# Patient Record
Sex: FEMALE | Race: WHITE | NOT HISPANIC OR LATINO | URBAN - METROPOLITAN AREA
[De-identification: names, ages, dates, MRNs, and addresses within clinical notes are randomized per-mention and may not be internally consistent; named-entity substitution may affect disease eponyms.]

---

## 2023-10-17 ENCOUNTER — EMERGENCY (EMERGENCY)
Facility: HOSPITAL | Age: 23
LOS: 1 days | Discharge: HOME | End: 2023-10-17
Attending: EMERGENCY MEDICINE | Admitting: EMERGENCY MEDICINE
Payer: COMMERCIAL

## 2023-10-17 VITALS
RESPIRATION RATE: 18 BRPM | TEMPERATURE: 99 F | OXYGEN SATURATION: 100 % | HEART RATE: 94 BPM | SYSTOLIC BLOOD PRESSURE: 131 MMHG | DIASTOLIC BLOOD PRESSURE: 80 MMHG

## 2023-10-17 VITALS
SYSTOLIC BLOOD PRESSURE: 132 MMHG | DIASTOLIC BLOOD PRESSURE: 85 MMHG | RESPIRATION RATE: 18 BRPM | OXYGEN SATURATION: 99 % | TEMPERATURE: 100 F | HEART RATE: 105 BPM | WEIGHT: 160.06 LBS

## 2023-10-17 LAB
ALBUMIN SERPL ELPH-MCNC: 4.8 G/DL — SIGNIFICANT CHANGE UP (ref 3.3–5)
ALBUMIN SERPL ELPH-MCNC: 4.8 G/DL — SIGNIFICANT CHANGE UP (ref 3.3–5)
ALP SERPL-CCNC: 60 U/L — SIGNIFICANT CHANGE UP (ref 40–120)
ALP SERPL-CCNC: 60 U/L — SIGNIFICANT CHANGE UP (ref 40–120)
ALT FLD-CCNC: 13 U/L — SIGNIFICANT CHANGE UP (ref 10–45)
ALT FLD-CCNC: 13 U/L — SIGNIFICANT CHANGE UP (ref 10–45)
ANION GAP SERPL CALC-SCNC: 17 MMOL/L — SIGNIFICANT CHANGE UP (ref 5–17)
ANION GAP SERPL CALC-SCNC: 17 MMOL/L — SIGNIFICANT CHANGE UP (ref 5–17)
APPEARANCE UR: CLEAR — SIGNIFICANT CHANGE UP
APPEARANCE UR: CLEAR — SIGNIFICANT CHANGE UP
AST SERPL-CCNC: 24 U/L — SIGNIFICANT CHANGE UP (ref 10–40)
AST SERPL-CCNC: 24 U/L — SIGNIFICANT CHANGE UP (ref 10–40)
BACTERIA # UR AUTO: PRESENT /HPF
BACTERIA # UR AUTO: PRESENT /HPF
BASOPHILS # BLD AUTO: 0.05 K/UL — SIGNIFICANT CHANGE UP (ref 0–0.2)
BASOPHILS # BLD AUTO: 0.05 K/UL — SIGNIFICANT CHANGE UP (ref 0–0.2)
BASOPHILS NFR BLD AUTO: 0.4 % — SIGNIFICANT CHANGE UP (ref 0–2)
BASOPHILS NFR BLD AUTO: 0.4 % — SIGNIFICANT CHANGE UP (ref 0–2)
BILIRUB SERPL-MCNC: 0.4 MG/DL — SIGNIFICANT CHANGE UP (ref 0.2–1.2)
BILIRUB SERPL-MCNC: 0.4 MG/DL — SIGNIFICANT CHANGE UP (ref 0.2–1.2)
BILIRUB UR-MCNC: NEGATIVE — SIGNIFICANT CHANGE UP
BILIRUB UR-MCNC: NEGATIVE — SIGNIFICANT CHANGE UP
BLD GP AB SCN SERPL QL: NEGATIVE — SIGNIFICANT CHANGE UP
BLD GP AB SCN SERPL QL: NEGATIVE — SIGNIFICANT CHANGE UP
BUN SERPL-MCNC: 15 MG/DL — SIGNIFICANT CHANGE UP (ref 7–23)
BUN SERPL-MCNC: 15 MG/DL — SIGNIFICANT CHANGE UP (ref 7–23)
CALCIUM SERPL-MCNC: 9.6 MG/DL — SIGNIFICANT CHANGE UP (ref 8.4–10.5)
CALCIUM SERPL-MCNC: 9.6 MG/DL — SIGNIFICANT CHANGE UP (ref 8.4–10.5)
CHLORIDE SERPL-SCNC: 103 MMOL/L — SIGNIFICANT CHANGE UP (ref 96–108)
CHLORIDE SERPL-SCNC: 103 MMOL/L — SIGNIFICANT CHANGE UP (ref 96–108)
CO2 SERPL-SCNC: 16 MMOL/L — LOW (ref 22–31)
CO2 SERPL-SCNC: 16 MMOL/L — LOW (ref 22–31)
COLOR SPEC: YELLOW — SIGNIFICANT CHANGE UP
COLOR SPEC: YELLOW — SIGNIFICANT CHANGE UP
CREAT SERPL-MCNC: 0.84 MG/DL — SIGNIFICANT CHANGE UP (ref 0.5–1.3)
CREAT SERPL-MCNC: 0.84 MG/DL — SIGNIFICANT CHANGE UP (ref 0.5–1.3)
DIFF PNL FLD: ABNORMAL
DIFF PNL FLD: ABNORMAL
EGFR: 99 ML/MIN/1.73M2 — SIGNIFICANT CHANGE UP
EGFR: 99 ML/MIN/1.73M2 — SIGNIFICANT CHANGE UP
EOSINOPHIL # BLD AUTO: 0.01 K/UL — SIGNIFICANT CHANGE UP (ref 0–0.5)
EOSINOPHIL # BLD AUTO: 0.01 K/UL — SIGNIFICANT CHANGE UP (ref 0–0.5)
EOSINOPHIL NFR BLD AUTO: 0.1 % — SIGNIFICANT CHANGE UP (ref 0–6)
EOSINOPHIL NFR BLD AUTO: 0.1 % — SIGNIFICANT CHANGE UP (ref 0–6)
EPI CELLS # UR: SIGNIFICANT CHANGE UP /HPF (ref 0–5)
EPI CELLS # UR: SIGNIFICANT CHANGE UP /HPF (ref 0–5)
GLUCOSE SERPL-MCNC: 105 MG/DL — HIGH (ref 70–99)
GLUCOSE SERPL-MCNC: 105 MG/DL — HIGH (ref 70–99)
GLUCOSE UR QL: NEGATIVE — SIGNIFICANT CHANGE UP
GLUCOSE UR QL: NEGATIVE — SIGNIFICANT CHANGE UP
HCG SERPL-ACNC: 764 MIU/ML — HIGH
HCG SERPL-ACNC: 764 MIU/ML — HIGH
HCT VFR BLD CALC: 42.2 % — SIGNIFICANT CHANGE UP (ref 34.5–45)
HCT VFR BLD CALC: 42.2 % — SIGNIFICANT CHANGE UP (ref 34.5–45)
HGB BLD-MCNC: 14.3 G/DL — SIGNIFICANT CHANGE UP (ref 11.5–15.5)
HGB BLD-MCNC: 14.3 G/DL — SIGNIFICANT CHANGE UP (ref 11.5–15.5)
IMM GRANULOCYTES NFR BLD AUTO: 0.4 % — SIGNIFICANT CHANGE UP (ref 0–0.9)
IMM GRANULOCYTES NFR BLD AUTO: 0.4 % — SIGNIFICANT CHANGE UP (ref 0–0.9)
KETONES UR-MCNC: NEGATIVE — SIGNIFICANT CHANGE UP
KETONES UR-MCNC: NEGATIVE — SIGNIFICANT CHANGE UP
LEUKOCYTE ESTERASE UR-ACNC: ABNORMAL
LEUKOCYTE ESTERASE UR-ACNC: ABNORMAL
LIDOCAIN IGE QN: 26 U/L — SIGNIFICANT CHANGE UP (ref 7–60)
LIDOCAIN IGE QN: 26 U/L — SIGNIFICANT CHANGE UP (ref 7–60)
LYMPHOCYTES # BLD AUTO: 15.5 % — SIGNIFICANT CHANGE UP (ref 13–44)
LYMPHOCYTES # BLD AUTO: 15.5 % — SIGNIFICANT CHANGE UP (ref 13–44)
LYMPHOCYTES # BLD AUTO: 2.05 K/UL — SIGNIFICANT CHANGE UP (ref 1–3.3)
LYMPHOCYTES # BLD AUTO: 2.05 K/UL — SIGNIFICANT CHANGE UP (ref 1–3.3)
MCHC RBC-ENTMCNC: 29.8 PG — SIGNIFICANT CHANGE UP (ref 27–34)
MCHC RBC-ENTMCNC: 29.8 PG — SIGNIFICANT CHANGE UP (ref 27–34)
MCHC RBC-ENTMCNC: 33.9 GM/DL — SIGNIFICANT CHANGE UP (ref 32–36)
MCHC RBC-ENTMCNC: 33.9 GM/DL — SIGNIFICANT CHANGE UP (ref 32–36)
MCV RBC AUTO: 87.9 FL — SIGNIFICANT CHANGE UP (ref 80–100)
MCV RBC AUTO: 87.9 FL — SIGNIFICANT CHANGE UP (ref 80–100)
MONOCYTES # BLD AUTO: 0.59 K/UL — SIGNIFICANT CHANGE UP (ref 0–0.9)
MONOCYTES # BLD AUTO: 0.59 K/UL — SIGNIFICANT CHANGE UP (ref 0–0.9)
MONOCYTES NFR BLD AUTO: 4.5 % — SIGNIFICANT CHANGE UP (ref 2–14)
MONOCYTES NFR BLD AUTO: 4.5 % — SIGNIFICANT CHANGE UP (ref 2–14)
NEUTROPHILS # BLD AUTO: 10.5 K/UL — HIGH (ref 1.8–7.4)
NEUTROPHILS # BLD AUTO: 10.5 K/UL — HIGH (ref 1.8–7.4)
NEUTROPHILS NFR BLD AUTO: 79.1 % — HIGH (ref 43–77)
NEUTROPHILS NFR BLD AUTO: 79.1 % — HIGH (ref 43–77)
NITRITE UR-MCNC: NEGATIVE — SIGNIFICANT CHANGE UP
NITRITE UR-MCNC: NEGATIVE — SIGNIFICANT CHANGE UP
NRBC # BLD: 0 /100 WBCS — SIGNIFICANT CHANGE UP (ref 0–0)
NRBC # BLD: 0 /100 WBCS — SIGNIFICANT CHANGE UP (ref 0–0)
PH UR: 6 — SIGNIFICANT CHANGE UP (ref 5–8)
PH UR: 6 — SIGNIFICANT CHANGE UP (ref 5–8)
PLATELET # BLD AUTO: 308 K/UL — SIGNIFICANT CHANGE UP (ref 150–400)
PLATELET # BLD AUTO: 308 K/UL — SIGNIFICANT CHANGE UP (ref 150–400)
POTASSIUM SERPL-MCNC: 4.2 MMOL/L — SIGNIFICANT CHANGE UP (ref 3.5–5.3)
POTASSIUM SERPL-MCNC: 4.2 MMOL/L — SIGNIFICANT CHANGE UP (ref 3.5–5.3)
POTASSIUM SERPL-SCNC: 4.2 MMOL/L — SIGNIFICANT CHANGE UP (ref 3.5–5.3)
POTASSIUM SERPL-SCNC: 4.2 MMOL/L — SIGNIFICANT CHANGE UP (ref 3.5–5.3)
PROT SERPL-MCNC: 7.7 G/DL — SIGNIFICANT CHANGE UP (ref 6–8.3)
PROT SERPL-MCNC: 7.7 G/DL — SIGNIFICANT CHANGE UP (ref 6–8.3)
PROT UR-MCNC: NEGATIVE MG/DL — SIGNIFICANT CHANGE UP
PROT UR-MCNC: NEGATIVE MG/DL — SIGNIFICANT CHANGE UP
RBC # BLD: 4.8 M/UL — SIGNIFICANT CHANGE UP (ref 3.8–5.2)
RBC # BLD: 4.8 M/UL — SIGNIFICANT CHANGE UP (ref 3.8–5.2)
RBC # FLD: 12.4 % — SIGNIFICANT CHANGE UP (ref 10.3–14.5)
RBC # FLD: 12.4 % — SIGNIFICANT CHANGE UP (ref 10.3–14.5)
RBC CASTS # UR COMP ASSIST: < 5 /HPF — SIGNIFICANT CHANGE UP
RBC CASTS # UR COMP ASSIST: < 5 /HPF — SIGNIFICANT CHANGE UP
RH IG SCN BLD-IMP: POSITIVE — SIGNIFICANT CHANGE UP
SODIUM SERPL-SCNC: 136 MMOL/L — SIGNIFICANT CHANGE UP (ref 135–145)
SODIUM SERPL-SCNC: 136 MMOL/L — SIGNIFICANT CHANGE UP (ref 135–145)
SP GR SPEC: <=1.005 — SIGNIFICANT CHANGE UP (ref 1–1.03)
SP GR SPEC: <=1.005 — SIGNIFICANT CHANGE UP (ref 1–1.03)
UROBILINOGEN FLD QL: 0.2 E.U./DL — SIGNIFICANT CHANGE UP
UROBILINOGEN FLD QL: 0.2 E.U./DL — SIGNIFICANT CHANGE UP
WBC # BLD: 13.25 K/UL — HIGH (ref 3.8–10.5)
WBC # BLD: 13.25 K/UL — HIGH (ref 3.8–10.5)
WBC # FLD AUTO: 13.25 K/UL — HIGH (ref 3.8–10.5)
WBC # FLD AUTO: 13.25 K/UL — HIGH (ref 3.8–10.5)
WBC UR QL: ABNORMAL /HPF
WBC UR QL: ABNORMAL /HPF

## 2023-10-17 PROCEDURE — 84702 CHORIONIC GONADOTROPIN TEST: CPT

## 2023-10-17 PROCEDURE — 80053 COMPREHEN METABOLIC PANEL: CPT

## 2023-10-17 PROCEDURE — 36415 COLL VENOUS BLD VENIPUNCTURE: CPT

## 2023-10-17 PROCEDURE — 99284 EMERGENCY DEPT VISIT MOD MDM: CPT | Mod: 25

## 2023-10-17 PROCEDURE — 86850 RBC ANTIBODY SCREEN: CPT

## 2023-10-17 PROCEDURE — 83690 ASSAY OF LIPASE: CPT

## 2023-10-17 PROCEDURE — 76801 OB US < 14 WKS SINGLE FETUS: CPT

## 2023-10-17 PROCEDURE — 99284 EMERGENCY DEPT VISIT MOD MDM: CPT

## 2023-10-17 PROCEDURE — 86900 BLOOD TYPING SEROLOGIC ABO: CPT

## 2023-10-17 PROCEDURE — 76801 OB US < 14 WKS SINGLE FETUS: CPT | Mod: 26

## 2023-10-17 PROCEDURE — 86901 BLOOD TYPING SEROLOGIC RH(D): CPT

## 2023-10-17 PROCEDURE — 81001 URINALYSIS AUTO W/SCOPE: CPT

## 2023-10-17 PROCEDURE — 76817 TRANSVAGINAL US OBSTETRIC: CPT | Mod: 26

## 2023-10-17 PROCEDURE — 76817 TRANSVAGINAL US OBSTETRIC: CPT

## 2023-10-17 PROCEDURE — 85025 COMPLETE CBC W/AUTO DIFF WBC: CPT

## 2023-10-17 RX ORDER — SODIUM CHLORIDE 9 MG/ML
1000 INJECTION INTRAMUSCULAR; INTRAVENOUS; SUBCUTANEOUS ONCE
Refills: 0 | Status: COMPLETED | OUTPATIENT
Start: 2023-10-17 | End: 2023-10-17

## 2023-10-17 RX ORDER — ACETAMINOPHEN 500 MG
1000 TABLET ORAL ONCE
Refills: 0 | Status: COMPLETED | OUTPATIENT
Start: 2023-10-17 | End: 2023-10-17

## 2023-10-17 RX ADMIN — SODIUM CHLORIDE 1000 MILLILITER(S): 9 INJECTION INTRAMUSCULAR; INTRAVENOUS; SUBCUTANEOUS at 18:42

## 2023-10-17 RX ADMIN — Medication 1000 MILLIGRAM(S): at 21:38

## 2023-10-17 NOTE — ED PROVIDER NOTE - PATIENT PORTAL LINK FT
You can access the FollowMyHealth Patient Portal offered by [facility] by registering at the following website: [email or web address]. By joining [employer]’s FollowMyHealth portal, you will also be able to view your health information using other applications (apps) compatible with our system.

## 2023-10-17 NOTE — CONSULT NOTE ADULT - ASSESSMENT
23y  at 5w3d w/ (questionable) LMP around 910 presenting for r/o ectopic pregnancy, found to have pregnancy of unknown location w/ bHCG of 764 today. Pregnancy is not desired.  - patient without pain or active vaginal bleeding, hemodynamically stable, with reassuring hgb and reassuring physical exam. Ruptured ectopic pregnancy unlikely.  - IUD removed today given contraceptive failure.  - patient counseled that given pregnancy of unknown location with bHCG of 764, currently it is too early to tell if this is a normal IUP vs abnormal IUP vs ectopic. She will return to [facility] ED for repeat bhcg. She was counseled to return as soon as possible if she experiences heavy vaginal bleeding, lightheadedness/dizziness, SOB, CP, as that may be symptoms of severe anemia.  - Patient safe for dc from GYN standpoint.  - d/w Dr. Wilkins 23y  at 5w3d w/ (questionable) LMP around 910 presenting for r/o ectopic pregnancy, found to have pregnancy of unknown location w/ bHCG of 764 today. Pregnancy is not desired.  - patient without pain or active vaginal bleeding, hemodynamically stable, with reassuring hgb and reassuring physical exam. Ruptured ectopic pregnancy unlikely.  - IUD removed today given contraceptive failure.  - patient counseled that given pregnancy of unknown location with bHCG of 764, currently it is too early to tell if this is a normal IUP vs abnormal IUP vs ectopic. She will return to [facility] ED for repeat bhcg. She was counseled to return as soon as possible if she experiences heavy vaginal bleeding, lightheadedness/dizziness, SOB, CP, as that may be symptoms of severe anemia.  - RH positive, no need for rhogam  - Patient safe for dc from GYN standpoint.  - d/w Dr. Wilkins

## 2023-10-17 NOTE — ED ADULT TRIAGE NOTE - CHIEF COMPLAINT QUOTE
+L pelvic pain x 2 days, severe, has IUD, +preg test at obgyn, +bleeding (1 tampon for 8 hours), r/o ectopic  LMP 10/9

## 2023-10-17 NOTE — CONSULT NOTE ADULT - SUBJECTIVE AND OBJECTIVE BOX
23y  at 5w3d w/ (questionable) LMP around 9/10 presenting with vaginal spotting, and mild abd cramping that has resolved. GYN consulted for r/o ectopic pregnancy. She reports feeling some mild lower abd cramping Monday morning which resolved w/ tylenol. She also developed some spotting, which is still ongoing. Denies lightheadedness/dizziness, SOB, CP, heavy vaginal bleeding. Denies N/V, F/C, issues with bowel movements or voiding. She went to her GYN today for these complaints and was told to go to [facility] ED as her UPT was positive today.     OB H/x: G1 current    GYN H/x: Has regular periods w/ IUD in place. (Mirena IUD in place for past 3 years)    MED H/x: denies    SURG H/x: knee surgery as a child, no hardware    Medications: none  Allergies: metronidazole (rash, facial swelling)  social: denies       Vital Signs Last 24 Hrs  T(C): 37.3 (17 Oct 2023 22:35), Max: 37.6 (17 Oct 2023 18:05)  T(F): 99.1 (17 Oct 2023 22:35), Max: 99.7 (17 Oct 2023 18:05)  HR: 94 (17 Oct 2023 22:35) (94 - 105)  BP: 131/80 (17 Oct 2023 22:35) (131/80 - 132/85)  BP(mean): --  RR: 18 (17 Oct 2023 22:35) (18 - 18)  SpO2: 100% (17 Oct 2023 22:35) (99% - 100%)    Parameters below as of 17 Oct 2023 22:35  Patient On (Oxygen Delivery Method): room air        Physical Exam:  Gen: NAD, comfortable  GI: soft, nontender, nondistended + BS, no rebound no guarding  BME: normal anteverted uterus, no adnexal masses appreciated , No cervical motion tenderness   Spec: normal cervix w/ IUD strings visible, normal vaginal vault, 5cc of old clots noted, no active bleeding with valsalva. IUD removed.  Ext: no edema, erythema, tenderness     LABS:                        14.3   13.25 )-----------( 308      ( 17 Oct 2023 18:57 )             42.2     10-    136  |  103  |  15  ----------------------------<  105<H>  4.2   |  16<L>  |  0.84    Ca    9.6      17 Oct 2023 18:57    TPro  7.7  /  Alb  4.8  /  TBili  0.4  /  DBili  x   /  AST  24  /  ALT  13  /  AlkPhos  60  10-17      Urinalysis Basic - ( 17 Oct 2023 18:58 )    Color: Yellow / Appearance: Clear / SG: <=1.005 / pH: x  Gluc: x / Ketone: NEGATIVE  / Bili: Negative / Urobili: 0.2 E.U./dL   Blood: x / Protein: NEGATIVE mg/dL / Nitrite: NEGATIVE   Leuk Esterase: Trace / RBC: < 5 /HPF / WBC 5-10 /HPF   Sq Epi: x / Non Sq Epi: x / Bacteria: Present /HPF    bHCT 10/17 764    RADIOLOGY & ADDITIONAL STUDIES:      ACC: [id number] EXAM:  US OB LES THAN 14 WKS 1ST GEST   ORDERED BY: DIANDRA YEAGER     ACC: [id number] EXAM:  US OB TRANSVAGINAL   ORDERED BY: DIANDRA YEAGER     PROCEDURE DATE:  10/17/2023          INTERPRETATION:  CLINICAL INFORMATION: RO torsion/ectopic pregnancy    LMP: 10/9/2023    COMPARISON:None available.    TECHNIQUE: Transabdominal and transvaginal pelvic sonogram.    FINDINGS:    Uterus:  No intrauterine or extrauterine gestation identified. Uterus measures 8.4   x 2.8 x 5.2 cm. Endometrium measures 6 mm in thickness. IUD in situ.    Right ovary: 2.8 x 1.3 x 2.0  cm. Within normal limits. Normal vascular   flow.  Left ovary: 3.0 x 2.0 x 2.1 cm. probable corpus luteum noted. Normal   vascular flow.  Free fluid:None.    IMPRESSION: No intrauterine or extrauterine gestation identified.   Recommend correlation with serial beta hCG and short-term follow-up study.    No evidence of ovarian torsion.    --- End of Report ---            KEVIN REDMAN MD; Attending Radiologist  This document has been electronically signed. Oct 17 2023  9:32PM

## 2023-10-17 NOTE — ED PROVIDER NOTE - CLINICAL SUMMARY MEDICAL DECISION MAKING FREE TEXT BOX
23 F w L sided pelvic pain IUD and + preg tst- us labs RO ectopic 23 F w L sided pelvic pain IUD and + preg tst- us labs RO ectopic  seen by GYN no obvious mass/ectopic- GYN- removed IUD and rec FU in 48 hrs- repeat beta

## 2023-10-17 NOTE — ED ADULT NURSE NOTE - OBJECTIVE STATEMENT
Pt  to ED c/o abd pain, L sided pelvic pain, spotting w/IUD in place, was at gyn had + preg test today, LMP a few days ago. Bled through 1 tampon today, no significant vaginal bleeding. Denies CP, SOB, dizziness, A&ox4. Pt denies n/v/d, fever, cough chills. ambulates w steady gait. speaking in full sentences.

## 2023-10-17 NOTE — ED PROVIDER NOTE - NSFOLLOWUPINSTRUCTIONS_ED_ALL_ED_FT
RETURN IN 48 HRS FOR REPEAT BLOOD WORK    RETURN IMMEDIATELY FOR INCREASING PAIN BLOATING LIGHTHEADEDNESS VAGINAL BLEEDING WEAKNESS

## 2023-10-17 NOTE — ED PROVIDER NOTE - OBJECTIVE STATEMENT
23 F co L sided pelvic pain- 1 day of sharp L sided pain- her LMP was a few days ago- ended 2-3 days ago- then started bleeding again- 1 tampon today- at gyn had + preg test today and has an IUD - sent in for ro ectopic  no f/c no n/v  + no distention

## 2023-10-19 ENCOUNTER — EMERGENCY (EMERGENCY)
Facility: HOSPITAL | Age: 23
LOS: 1 days | Discharge: HOME | End: 2023-10-19
Admitting: EMERGENCY MEDICINE
Payer: COMMERCIAL

## 2023-10-19 VITALS
DIASTOLIC BLOOD PRESSURE: 82 MMHG | RESPIRATION RATE: 18 BRPM | OXYGEN SATURATION: 98 % | SYSTOLIC BLOOD PRESSURE: 131 MMHG | HEART RATE: 100 BPM | WEIGHT: 160.06 LBS | TEMPERATURE: 98 F

## 2023-10-19 VITALS
HEART RATE: 70 BPM | RESPIRATION RATE: 18 BRPM | TEMPERATURE: 99 F | SYSTOLIC BLOOD PRESSURE: 114 MMHG | DIASTOLIC BLOOD PRESSURE: 75 MMHG | OXYGEN SATURATION: 100 %

## 2023-10-19 DIAGNOSIS — Z88.1 ALLERGY STATUS TO OTHER ANTIBIOTIC AGENTS STATUS: ICD-10-CM

## 2023-10-19 DIAGNOSIS — Z3A.01 LESS THAN 8 WEEKS GESTATION OF PREGNANCY: ICD-10-CM

## 2023-10-19 DIAGNOSIS — O26.851 SPOTTING COMPLICATING PREGNANCY, FIRST TRIMESTER: ICD-10-CM

## 2023-10-19 DIAGNOSIS — Z87.39 PERSONAL HISTORY OF OTHER DISEASES OF THE MUSCULOSKELETAL SYSTEM AND CONNECTIVE TISSUE: ICD-10-CM

## 2023-10-19 DIAGNOSIS — O20.9 HEMORRHAGE IN EARLY PREGNANCY, UNSPECIFIED: ICD-10-CM

## 2023-10-19 DIAGNOSIS — R10.2 PELVIC AND PERINEAL PAIN: ICD-10-CM

## 2023-10-19 DIAGNOSIS — N83.202 UNSPECIFIED OVARIAN CYST, LEFT SIDE: ICD-10-CM

## 2023-10-19 DIAGNOSIS — O99.891 OTHER SPECIFIED DISEASES AND CONDITIONS COMPLICATING PREGNANCY: ICD-10-CM

## 2023-10-19 DIAGNOSIS — Z88.8 ALLERGY STATUS TO OTHER DRUGS, MEDICAMENTS AND BIOLOGICAL SUBSTANCES: ICD-10-CM

## 2023-10-19 DIAGNOSIS — O36.80X0 PREGNANCY WITH INCONCLUSIVE FETAL VIABILITY, NOT APPLICABLE OR UNSPECIFIED: ICD-10-CM

## 2023-10-19 DIAGNOSIS — Z97.5 PRESENCE OF (INTRAUTERINE) CONTRACEPTIVE DEVICE: ICD-10-CM

## 2023-10-19 DIAGNOSIS — Z01.419 ENCOUNTER FOR GYNECOLOGICAL EXAMINATION (GENERAL) (ROUTINE) WITHOUT ABNORMAL FINDINGS: ICD-10-CM

## 2023-10-19 DIAGNOSIS — O34.81 MATERNAL CARE FOR OTHER ABNORMALITIES OF PELVIC ORGANS, FIRST TRIMESTER: ICD-10-CM

## 2023-10-19 LAB
HCG SERPL-ACNC: 404 MIU/ML — HIGH
HCG SERPL-ACNC: 404 MIU/ML — HIGH

## 2023-10-19 PROCEDURE — 76817 TRANSVAGINAL US OBSTETRIC: CPT

## 2023-10-19 PROCEDURE — 36415 COLL VENOUS BLD VENIPUNCTURE: CPT

## 2023-10-19 PROCEDURE — 84702 CHORIONIC GONADOTROPIN TEST: CPT

## 2023-10-19 PROCEDURE — 76801 OB US < 14 WKS SINGLE FETUS: CPT

## 2023-10-19 PROCEDURE — 99285 EMERGENCY DEPT VISIT HI MDM: CPT

## 2023-10-19 PROCEDURE — 76801 OB US < 14 WKS SINGLE FETUS: CPT | Mod: 26

## 2023-10-19 PROCEDURE — 76817 TRANSVAGINAL US OBSTETRIC: CPT | Mod: 26

## 2023-10-19 PROCEDURE — 99284 EMERGENCY DEPT VISIT MOD MDM: CPT | Mod: 25

## 2023-10-19 NOTE — ED PROVIDER NOTE - CLINICAL SUMMARY MEDICAL DECISION MAKING FREE TEXT BOX
pt returns to ED for beta hcg check -- was seen on 10/17 and had IUD removed (beta was 764), pt feels well, no c/o, no abd pain, using 1 tampon/day, benign abd, hemodynamically stable, beta trending down - gyn cleared for dc, strict return precautions given, pt understands and agrees w/plan pt returns to ED for beta hcg check -- was seen on 10/17 and had IUD removed (beta was 764), pt feels well, no c/o, no abd pain, using 1 tampon/day, benign abd, hemodynamically stable, beta trending down - us done as per gyn -  ? ectopic vs fibroid - f/u in 2 d. gyn cleared for dc, strict return precautions given, pt understands and agrees w/plan

## 2023-10-19 NOTE — ED PROVIDER NOTE - NSFOLLOWUPINSTRUCTIONS_ED_ALL_ED_FT
please return in 2 days for repeat blood work and evaluation  return immediately for any worsening or concerning symptoms

## 2023-10-19 NOTE — ED PROVIDER NOTE - GENITOURINARY [+], MLM
beta hcg check [place] Pregnancy And Lactation Text: This medication is Pregnancy Category X and contraindicated in pregnancy and in women who may become pregnant. It is unknown if this medication is excreted in breast milk.

## 2023-10-19 NOTE — CONSULT NOTE ADULT - ASSESSMENT
22yo  at 5w2d presents with POUL, bhcg is downtrending from 764 to 404.   -Pt asymptomatic today   -A+ blood type, no need rhogam   -VSS   -Please obtain US   -Dr. Wilkins in agreement w/ plan  24yo  at 5w4d presents with POUL, bhcg is downtrending from 764 to 404.   -Pt has no abdominal pain or cramping today, minimal vaginal bleeding.   -A+ blood type, no need rhogam   -VSS   -Please obtain US    -Dr. Wilkins in agreement w/ plan  24yo  at 5w4d presents with POUL vs L adnexal ectopic, bhcg is downtrending from 764 to 404.   -VSS Pt has no abdominal pain or cramping today, minimal vaginal bleeding.   -A+ blood type, no need rhogam   -US as above shows new 1.5cm cystic structure in the L adnexa suggestive of ectopic pregnancy vs fibroid. Imperative to have pt have short term follow up   -Pt to come back in 2d for repeat bhcg, +/- US.   -Per Dr Wilkins no need to give MTX since bhcg is trending down and pt does not have any abdominal pain/cramping.   -Pt given strict return precautions; pt advised that ectopic pregnancy can be life threatening and it is important to keep close follow up.  Pt advised to watch for severe abd pain, heavy vaginal bleeding, dizziness, lightheadedness.  Pt understands and states she will follow up in 2d.   -Dr. Wilkins in agreement w/ plan

## 2023-10-19 NOTE — ED PROVIDER NOTE - PATIENT PORTAL LINK FT
You can access the FollowMyHealth Patient Portal offered by [facility] by registering at the following website: [email or web address]. By joining [employer]’s FollowMyHealth portal, you will also be able to view your health information using other applications (apps) compatible with our system.

## 2023-10-19 NOTE — CONSULT NOTE ADULT - SUBJECTIVE AND OBJECTIVE BOX
23y  at 5w6d w/ (questionable) LMP around 9/10 presenting for repeat bhcg with Pregnancy of unknown location. Pt came in 2d ago with vaginal bleeding and cramping that resolved upon arrival, at that time pt had a bhcg of 764 and POUL on US, pt had IUD remoed at that time.  Pt came back today with no complaints. Denies lightheadedness/dizziness, SOB, CP, heavy vaginal bleeding. Denies N/V, F/C, issues with bowel movements or voiding.     OB H/x: G1 current  GYN H/x: Has regular periods w/ IUD in place. (Mirena IUD in place for past 3 years) removed on 10/17   MED H/x: denies  SURG H/x: knee surgery as a child, no hardware  Medications: none  Allergies: metronidazole (rash, facial swelling)  social: denies       PHYSICAL EXAM:   Vital Signs Last 24 Hrs  T(C): 36.8 (19 Oct 2023 09:24), Max: 36.8 (19 Oct 2023 09:24)  T(F): 98.3 (19 Oct 2023 09:24), Max: 98.3 (19 Oct 2023 09:24)  HR: 100 (19 Oct 2023 09:24) (100 - 100)  BP: 131/82 (19 Oct 2023 09:24) (131/82 - 131/82)  BP(mean): --  RR: 18 (19 Oct 2023 09:24) (18 - 18)  SpO2: 98% (19 Oct 2023 09:24) (98% - 98%)    Parameters below as of 19 Oct 2023 09:24  Patient On (Oxygen Delivery Method): room air        **************************  Constitutional: Alert & Oriented x3, No acute distress  Respiratory: Clear to ausculation bilaterally; no wheezing, rhonchi, or crackles  Cardiovascular: regular rate and rhythm, no murmurs, or gallops  Gastrointestinal: soft, non tender, positive bowel sounds, no rebound or guarding   Pelvic exam:   Extremities: no calf tenderness or swelling    LABS:                        14.3   13.25 )-----------( 308      ( 17 Oct 2023 18:57 )             42.2     10-17    136  |  103  |  15  ----------------------------<  105<H>  4.2   |  16<L>  |  0.84    Ca    9.6      17 Oct 2023 18:57    TPro  7.7  /  Alb  4.8  /  TBili  0.4  /  DBili  x   /  AST  24  /  ALT  13  /  AlkPhos  60  10-17      Urinalysis Basic - ( 17 Oct 2023 18:58 )    Color: Yellow / Appearance: Clear / SG: <=1.005 / pH: x  Gluc: x / Ketone: NEGATIVE  / Bili: Negative / Urobili: 0.2 E.U./dL   Blood: x / Protein: NEGATIVE mg/dL / Nitrite: NEGATIVE   Leuk Esterase: Trace / RBC: < 5 /HPF / WBC 5-10 /HPF   Sq Epi: x / Non Sq Epi: x / Bacteria: Present /HPF      HCG Quantitative, Serum: 404 mIU/mL (10-19 @ 09:34)      RADIOLOGY & ADDITIONAL STUDIES: 23y  at 5w4d w/ (questionable) LMP around 9/10 presenting for repeat bhcg due to Pregnancy of unknown location. Pt came in 2d ago with vaginal bleeding and cramping that resolved upon arrival, at that time pt had a bhcg of 764 and POUL on US, pt had IUD removed at that time.  Pt came back today with complaint of vaginal bleeding.  Pt states since we removed her IUD she has been using 2 tampons per day. Pt denies any abdominal cramping/pain. Denies lightheadedness/dizziness, SOB, CP, heavy vaginal bleeding. Denies N/V, F/C, issues with bowel movements or voiding.     OB H/x: G1 current  GYN H/x: Has regular periods w/ IUD in place. (Mirena IUD in place for past 3 years) removed on 10/17   MED H/x: denies  SURG H/x: knee surgery as a child, no hardware  Medications: none  Allergies: metronidazole (rash, facial swelling)  social: denies       PHYSICAL EXAM:   Vital Signs Last 24 Hrs  T(C): 36.8 (19 Oct 2023 09:24), Max: 36.8 (19 Oct 2023 09:24)  T(F): 98.3 (19 Oct 2023 09:24), Max: 98.3 (19 Oct 2023 09:24)  HR: 100 (19 Oct 2023 09:24) (100 - 100)  BP: 131/82 (19 Oct 2023 09:24) (131/82 - 131/82)  RR: 18 (19 Oct 2023 09:24) (18 - 18)  SpO2: 98% (19 Oct 2023 09:24) (98% - 98%)    Parameters below as of 19 Oct 2023 09:24  Patient On (Oxygen Delivery Method): room air        **************************  Constitutional: Alert & Oriented x3, No acute distress  Respiratory: Clear to ausculation bilaterally; no wheezing, rhonchi, or crackles  Cardiovascular: regular rate and rhythm, no murmurs, or gallops  Gastrointestinal: soft, non tender, positive bowel sounds, no rebound or guarding   Pelvic exam: 10cc of blood in vaginal vault, cervix closed. no CMT   Extremities: no calf tenderness or swelling    LABS:                        14.3   13.25 )-----------( 308      ( 17 Oct 2023 18:57 )             42.2     10    136  |  103  |  15  ----------------------------<  105<H>  4.2   |  16<L>  |  0.84    Ca    9.6      17 Oct 2023 18:57    TPro  7.7  /  Alb  4.8  /  TBili  0.4  /  DBili  x   /  AST  24  /  ALT  13  /  AlkPhos  60  10-17      Urinalysis Basic - ( 17 Oct 2023 18:58 )    Color: Yellow / Appearance: Clear / SG: <=1.005 / pH: x  Gluc: x / Ketone: NEGATIVE  / Bili: Negative / Urobili: 0.2 E.U./dL   Blood: x / Protein: NEGATIVE mg/dL / Nitrite: NEGATIVE   Leuk Esterase: Trace / RBC: < 5 /HPF / WBC 5-10 /HPF   Sq Epi: x / Non Sq Epi: x / Bacteria: Present /HPF      HCG Quantitative, Serum: 404 mIU/mL (10-19 @ 09:34)      RADIOLOGY & ADDITIONAL STUDIES:  TVUS  23y  at 5w4d w/ (questionable) LMP around 9/10 presenting for repeat bhcg due to Pregnancy of unknown location. Pt came in 2d ago with vaginal bleeding and cramping that resolved upon arrival, at that time pt had a bhcg of 764 and POUL on US, pt had IUD removed at that time.  Pt came back today with complaint of vaginal bleeding.  Pt states since we removed her IUD she has been using 2 tampons per day. Pt denies any abdominal cramping/pain. Denies lightheadedness/dizziness, SOB, CP, heavy vaginal bleeding. Denies N/V, F/C, issues with bowel movements or voiding.     OB H/x: G1 current  GYN H/x: Has regular periods w/ IUD in place. (Mirena IUD in place for past 3 years) removed on 10/17   MED H/x: denies  SURG H/x: knee surgery as a child, no hardware  Medications: none  Allergies: metronidazole (rash, facial swelling)  social: denies       PHYSICAL EXAM:   Vital Signs Last 24 Hrs  T(C): 36.8 (19 Oct 2023 09:24), Max: 36.8 (19 Oct 2023 09:24)  T(F): 98.3 (19 Oct 2023 09:24), Max: 98.3 (19 Oct 2023 09:24)  HR: 100 (19 Oct 2023 09:24) (100 - 100)  BP: 131/82 (19 Oct 2023 09:24) (131/82 - 131/82)  RR: 18 (19 Oct 2023 09:24) (18 - 18)  SpO2: 98% (19 Oct 2023 09:24) (98% - 98%)    Parameters below as of 19 Oct 2023 09:24  Patient On (Oxygen Delivery Method): room air        **************************  Constitutional: Alert & Oriented x3, No acute distress  Respiratory: Clear to ausculation bilaterally; no wheezing, rhonchi, or crackles  Cardiovascular: regular rate and rhythm, no murmurs, or gallops  Gastrointestinal: soft, non tender, positive bowel sounds, no rebound or guarding   Pelvic exam: 10cc of blood in vaginal vault, cervix closed. no CMT   Extremities: no calf tenderness or swelling    LABS:                        14.3   13.25 )-----------( 308      ( 17 Oct 2023 18:57 )             42.2     10-17    136  |  103  |  15  ----------------------------<  105<H>  4.2   |  16<L>  |  0.84    Ca    9.6      17 Oct 2023 18:57    TPro  7.7  /  Alb  4.8  /  TBili  0.4  /  DBili  x   /  AST  24  /  ALT  13  /  AlkPhos  60  10-17      Urinalysis Basic - ( 17 Oct 2023 18:58 )    Color: Yellow / Appearance: Clear / SG: <=1.005 / pH: x  Gluc: x / Ketone: NEGATIVE  / Bili: Negative / Urobili: 0.2 E.U./dL   Blood: x / Protein: NEGATIVE mg/dL / Nitrite: NEGATIVE   Leuk Esterase: Trace / RBC: < 5 /HPF / WBC 5-10 /HPF   Sq Epi: x / Non Sq Epi: x / Bacteria: Present /HPF      HCG Quantitative, Serum: 404 mIU/mL (10-19 @ 09:34)      RADIOLOGY & ADDITIONAL STUDIES:  TVUS   ACC: [id number] EXAM:  US OB LES THAN 14 WKS 1ST GEST   ORDERED BY: EDISON MELÉNDEZ     ACC: [id number] EXAM:  US OB TRANSVAGINAL   ORDERED BY: EDISON MELÉNDEZ     PROCEDURE DATE:  10/19/2023          INTERPRETATION:  CLINICAL INFORMATION: Positive pregnancy, IUD removed   10/17/2023. Follow-up. cuHG=355 (previously 764)    LMP: 10/09/2023    Estimated Gestational Age by LMP: 1 week 3 days    COMPARISON: Pelvic ultrasound 10/17/2023    Endovaginal and transabdominal pelvic sonogram. Color and Spectral   Doppler was performed.    FINDINGS:  Uterus: 5.5 x 3.7 x 4.9 cm.  Endometrium: 0.6 cm.  Cervix: Closed. Length of 2.2cm.    No intrauterine gestation is seen.    In the left adnexa near the uterine fundus, there is a 1.2 x 1.3 x 1.5 cm   thick-walled partially cystic structure with suggestion of peripheral   vascularity. This is separate from the left ovary but it is unclear   whether it is distinct from the uterus. This could represent an exophytic   uterine fibroid, however an ectopic pregnancy cannot be excluded.      Right ovary: 2.1 cm x 1.4 cm x 1.2 cm. Within normal limits. Normal   arterial and venous waveforms.  Left ovary: 2.5 cm x 1.4 cm x 2.5 cm. 1.3cm probable corpus luteum.   Normal arterial and venous waveforms.    Fluid: None.    IMPRESSION:    1. No intrauterine gestation identified.    2. In the left adnexa, there is a 1.5 cm thick-walled partially cystic   structure with suggestion of peripheral vascularity. This is separate   from the left ovary but it is unclear whether it is distinct from the   uterus. This could represent an exophytic uterine fibroid or an ectopic   pregnancy. Recommend short term follow-up ultrasound to exclude ectopic   pregnancy and serial bhCG.    Findings were discussed with LAINEY Meléndez 10/19/2023 2:00 PM by   Dr. Jones with read back confirmation.    --- End of Report ---          RENALDO JONES MD; Resident Radiologist  This document has been electronically signed.  CIERRA HOLDEN MD; Attending Radiologist  This document has been electronically signed. Oct 19 2023  2:16PM

## 2023-10-19 NOTE — ED ADULT NURSE NOTE - OBJECTIVE STATEMENT
Pt is 23 y.o female pt came in for hcg check. Pt was seen here in the ED and got her IUD taken out. Per pt LMP 10/09/2023. Denies abd pain, sob, fever, chills. Pt also reports vaginal bleeding however per pt not as severe. Denies any PMHx. Assessment ongoing.

## 2023-10-19 NOTE — ED PROVIDER NOTE - NS ED ATTENDING NAME FT
Bed/Stretcher in lowest position, wheels locked, appropriate side rails in place/Call bell, personal items and telephone in reach/Instruct patient to call for assistance before getting out of bed/chair/stretcher/Non-slip footwear applied when patient is off stretcher/[place] to call system/Physically safe environment - no spills, clutter or unnecessary equipment/Purposeful proactive rounding/Room/bathroom lighting operational, light cord in reach dr norman

## 2023-10-19 NOTE — ED PROVIDER NOTE - OBJECTIVE STATEMENT
The pt is a 24 y/o F, who returns to ED for beta hcg check -- was seen on 10/17 and found to have beta 764, had IUD removed. Pt states that is using 1 tampon/day, no abd pain. Denies fevers, chills, cp, sob, n/v/d, abd pain, flank pain, dysuria.

## 2023-10-21 ENCOUNTER — EMERGENCY (EMERGENCY)
Facility: HOSPITAL | Age: 23
LOS: 1 days | Discharge: HOME | End: 2023-10-21
Admitting: EMERGENCY MEDICINE
Payer: COMMERCIAL

## 2023-10-21 VITALS
SYSTOLIC BLOOD PRESSURE: 110 MMHG | DIASTOLIC BLOOD PRESSURE: 68 MMHG | TEMPERATURE: 99 F | RESPIRATION RATE: 18 BRPM | HEART RATE: 68 BPM | OXYGEN SATURATION: 98 %

## 2023-10-21 VITALS
OXYGEN SATURATION: 98 % | RESPIRATION RATE: 18 BRPM | HEART RATE: 83 BPM | TEMPERATURE: 98 F | SYSTOLIC BLOOD PRESSURE: 128 MMHG | DIASTOLIC BLOOD PRESSURE: 80 MMHG

## 2023-10-21 DIAGNOSIS — Z88.8 ALLERGY STATUS TO OTHER DRUGS, MEDICAMENTS AND BIOLOGICAL SUBSTANCES: ICD-10-CM

## 2023-10-21 DIAGNOSIS — O02.81 INAPPROPRIATE CHANGE IN QUANTITATIVE HUMAN CHORIONIC GONADOTROPIN (HCG) IN EARLY PREGNANCY: ICD-10-CM

## 2023-10-21 DIAGNOSIS — O00.91 UNSPECIFIED ECTOPIC PREGNANCY WITH INTRAUTERINE PREGNANCY: ICD-10-CM

## 2023-10-21 LAB
HCG SERPL-ACNC: 208 MIU/ML — HIGH
HCG SERPL-ACNC: 208 MIU/ML — HIGH

## 2023-10-21 PROCEDURE — 99284 EMERGENCY DEPT VISIT MOD MDM: CPT

## 2023-10-21 PROCEDURE — 36415 COLL VENOUS BLD VENIPUNCTURE: CPT

## 2023-10-21 PROCEDURE — 84702 CHORIONIC GONADOTROPIN TEST: CPT

## 2023-10-21 NOTE — ED PROVIDER NOTE - PHYSICAL EXAMINATION
Vitals reviewed  Gen: well appearing, nad, speaking in full sentences  Skin: wwp, no rash/lesions  HEENT: ncat, eomi, mmm  CV: rrr, no audible m/r/g  Resp: symmetrical expansion, ctab, no w/r/r  Abd: nondistended, soft/nt, no r/g   Ext: FROM throughout, no peripheral edema  Neuro: alert/oriented, no focal deficits, steady gait

## 2023-10-21 NOTE — ED ADULT NURSE REASSESSMENT NOTE - NS ED NURSE REASSESS COMMENT FT1
Labs resulted, seen by OB GYNE, no pain or any new symptom complaint. Vital signs stable.  discharged to home in stable condition.

## 2023-10-21 NOTE — ED ADULT NURSE NOTE - FINAL NURSING ELECTRONIC SIGNATURE
Regular diet - low fat, low cholesterol, no added salt. Change dressing daily 21-Oct-2023 15:29 follow discharge instruction

## 2023-10-21 NOTE — CONSULT NOTE ADULT - ASSESSMENT
24yo  at 5w4d presents with POUL vs L adnexal ectopic, bhcg is downtrending from 764 to 404 to 208. No signs/symptoms of ectopic rupture. Presentation most consistent with resolving ectopic pregnancy  - Patient informed to return to ED Monday 10/22 for repeat bHCG  - Precautions reviewed: Patient to return here ASAP or to nearest ED If any sharp abdominal pain, heavy bleeding, or lightheadedness/dizziness  - Continue pelvic rest    [place] PGY2 discussed with [place] PGY3 and Dr. Smith

## 2023-10-21 NOTE — ED PROVIDER NOTE - NSFOLLOWUPINSTRUCTIONS_ED_ALL_ED_FT
Return to ED in 2 days for repeat blood tests    return sooner if you have fever, severe pain, heavy bleeding or other concerns    Ectopic Pregnancy    An ectopic pregnancy happens when a fertilized egg attaches (implants) outside the uterus. In a normal pregnancy, a fertilized egg implants in the uterus. An ectopic pregnancy cannot develop into a healthy baby. Most ectopic pregnancies occur in one of the fallopian tubes, which is where an egg travels from an ovary to get to the uterus. This is called a tubal pregnancy. An ectopic pregnancy can also happen on an ovary, on the cervix, or in the abdomen.    When a fertilized egg implants on tissue outside the uterus and begins to grow, it may cause the tissue to tear or burst. This is known as a ruptured ectopic pregnancy. The tear or burst causes internal bleeding. This may cause intense pain in the abdomen. An ectopic pregnancy is a medical emergency and can be life-threatening.    What are the causes?  The most common cause of this condition is damage to one of the fallopian tubes. A fallopian tube may be narrowed or blocked, and that stops the fertilized egg from reaching the uterus.    Sometimes, the cause of this condition is not known.    What increases the risk?  The following factors may make you more likely to develop this condition:  Having gone through infertility treatment before.  Having had an ectopic pregnancy before.  Having had surgery to have the fallopian tubes tied.  Becoming pregnant while using an intrauterine device for birth control.  Taking birth control pills before the age of 16.  Other risk factors include:  Smoking.  Alcohol use.  History of OCTAVIA exposure. OCTAVIA is a medicine that was used until 1971 and affected babies whose mothers took the medicine.  What are the signs or symptoms?  Common symptoms of this condition include:  Missing a menstrual period.  Nausea or tiredness.  Tender breasts.  Other normal pregnancy symptoms.  Other symptoms may include:  Pain during sex.  Vaginal bleeding or spotting.  Cramping or pain in the lower abdomen.  A fast heartbeat, low blood pressure, and sweating.  Pain or increased pressure while having a bowel movement.  Symptoms of a ruptured ectopic pregnancy and internal bleeding may include:  Sudden, severe pain in the abdomen.  Dizziness, weakness, feeling light-headed, or fainting.  Pain in the shoulder or neck area.  How is this diagnosed?  This condition is diagnosed by:  A blood test to check for the pregnancy hormone.  A pelvic exam to find painful areas or a mass in the abdomen.  Ultrasound. A probe is inserted into the vagina to see if there is a pregnancy in or outside the uterus.  Taking a sample of tissue from the uterus.  Surgery to look closely at the fallopian tubes through an incision in the abdomen.  How is this treated?  This condition is usually treated with medicine or surgery. Sometimes, ectopic pregnancies can resolve on their own, under close monitoring by your health care provider.    Medicine    A medicine called methotrexate may be given to cause the pregnancy tissue to be absorbed. The medicine may be given if:  The diagnosis is made early, with no signs of active bleeding.  The fallopian tube has not torn or burst.  You will need blood tests to make sure the medicine is working. It may take 4–6 weeks for the pregnancy tissues to be absorbed.    Surgery    Surgery may be performed to:  Remove the pregnancy tissue.  Stop internal bleeding.  Remove part or all of the fallopian tube.  Remove the uterus. This is rare.  After surgery, you may need to have blood tests to make sure the surgery worked.    Follow these instructions at home:  Medicines    Take over-the-counter and prescription medicines only as told by your health care provider.  Ask your health care provider if the medicine prescribed to you:  Requires you to avoid driving or using machinery.  Can cause constipation. You may need to take these actions to prevent or treat constipation:  Drink enough fluid to keep your urine pale yellow.  Take over-the-counter or prescription medicines.  Eat foods that are high in fiber, such as beans, whole grains, and fresh fruits and vegetables.  Limit foods that are high in fat and processed sugars, such as fried or sweet foods.  General instructions    Rest or limit your activity, if told by your health care provider.  Do not have sex or put anything in your vagina, such as tampons or douches, for 6 weeks or until your health care provider says it is safe.  Do not lift anything that is heavier than 10 lb (4.5 kg), or the limit that you are told, until your health care provider says that it is safe.  Return to your normal activities as told by your health care provider. Ask your health care provider what activities are safe for you.  Keep all follow-up visits. This is important.  Contact a health care provider if:  You have a fever or chills.  You have nausea and vomiting.  Get help right away if:  Your pain gets worse or is not relieved by medicine.  You feel dizzy or weak.  You feel light-headed or you faint.  You have sudden, severe pain in your abdomen.  You have sudden pain in the shoulder or neck area.  Summary  An ectopic pregnancy happens when a fertilized egg implants outside the uterus. Most ectopic pregnancies occur in one of the fallopian tubes.  An ectopic pregnancy is a medical emergency and can be life-threatening.  The most common cause of this condition is damage to one of the fallopian tubes.  This condition is usually treated with medicine or surgery. Some ectopic pregnancies resolve on their own, under close monitoring by your health care provider.  This information is not intended to replace advice given to you by your health care provider. Make sure you discuss any questions you have with your health care provider.

## 2023-10-21 NOTE — ED ADULT NURSE NOTE - CAS TRG GEN SKIN CONDITION
Spironolactone Pregnancy And Lactation Text: This medication can cause feminization of the male fetus and should be avoided during pregnancy. The active metabolite is also found in breast milk. High Dose Vitamin A Pregnancy And Lactation Text: High dose vitamin A therapy is contraindicated during pregnancy and breast feeding. Cosentyx Pregnancy And Lactation Text: This medication is Pregnancy Category B and is considered safe during pregnancy. It is unknown if this medication is excreted in breast milk. Valtrex Pregnancy And Lactation Text: this medication is Pregnancy Category B and is considered safe during pregnancy. This medication is not directly found in breast milk but it's metabolite acyclovir is present. Hydroxyzine Counseling: Patient advised that the medication is sedating and not to drive a car after taking this medication.  Patient informed of potential adverse effects including but not limited to dry mouth, urinary retention, and blurry vision.  The patient verbalized understanding of the proper use and possible adverse effects of hydroxyzine.  All of the patient's questions and concerns were addressed. Bactrim Pregnancy And Lactation Text: This medication is Pregnancy Category D and is known to cause fetal risk.  It is also excreted in breast milk. Dupixent Counseling: I discussed with the patient the risks of dupilumab including but not limited to eye infection and irritation, cold sores, injection site reactions, worsening of asthma, allergic reactions and increased risk of parasitic infection.  Live vaccines should be avoided while taking dupilumab. Dupilumab will also interact with certain medications such as warfarin and cyclosporine. The patient understands that monitoring is required and they must alert us or the primary physician if symptoms of infection or other concerning signs are noted. Warm SSKI Counseling:  I discussed with the patient the risks of SSKI including but not limited to thyroid abnormalities, metallic taste, GI upset, fever, headache, acne, arthralgias, paraesthesias, lymphadenopathy, easy bleeding, arrhythmias, and allergic reaction. Xeljaycez Pregnancy And Lactation Text: This medication is Pregnancy Category D and is not considered safe during pregnancy.  The risk during breast feeding is also uncertain. Cyclosporine Pregnancy And Lactation Text: This medication is Pregnancy Category C and it isn't know if it is safe during pregnancy. This medication is excreted in breast milk. Detail Level: Detailed Terbinafine Pregnancy And Lactation Text: This medication is Pregnancy Category B and is considered safe during pregnancy. It is also excreted in breast milk and breast feeding isn't recommended. Dapsone Counseling: I discussed with the patient the risks of dapsone including but not limited to hemolytic anemia, agranulocytosis, rashes, methemoglobinemia, kidney failure, peripheral neuropathy, headaches, GI upset, and liver toxicity.  Patients who start dapsone require monitoring including baseline LFTs and weekly CBCs for the first month, then every month thereafter.  The patient verbalized understanding of the proper use and possible adverse effects of dapsone.  All of the patient's questions and concerns were addressed. Colchicine Counseling:  Patient counseled regarding adverse effects including but not limited to stomach upset (nausea, vomiting, stomach pain, or diarrhea).  Patient instructed to limit alcohol consumption while taking this medication.  Colchicine may reduce blood counts especially with prolonged use.  The patient understands that monitoring of kidney function and blood counts may be required, especially at baseline. The patient verbalized understanding of the proper use and possible adverse effects of colchicine.  All of the patient's questions and concerns were addressed. Glycopyrrolate Counseling:  I discussed with the patient the risks of glycopyrrolate including but not limited to skin rash, drowsiness, dry mouth, difficulty urinating, and blurred vision. Simponi Counseling:  I discussed with the patient the risks of golimumab including but not limited to myelosuppression, immunosuppression, autoimmune hepatitis, demyelinating diseases, lymphoma, and serious infections.  The patient understands that monitoring is required including a PPD at baseline and must alert us or the primary physician if symptoms of infection or other concerning signs are noted. Rituxan Counseling:  I discussed with the patient the risks of Rituxan infusions. Side effects can include infusion reactions, severe drug rashes including mucocutaneous reactions, reactivation of latent hepatitis and other infections and rarely progressive multifocal leukoencephalopathy.  All of the patient's questions and concerns were addressed. Xolair Counseling:  Patient informed of potential adverse effects including but not limited to fever, muscle aches, rash and allergic reactions.  The patient verbalized understanding of the proper use and possible adverse effects of Xolair.  All of the patient's questions and concerns were addressed. Cellcept Counseling:  I discussed with the patient the risks of mycophenolate mofetil including but not limited to infection/immunosuppression, GI upset, hypokalemia, hypercholesterolemia, bone marrow suppression, lymphoproliferative disorders, malignancy, GI ulceration/bleed/perforation, colitis, interstitial lung disease, kidney failure, progressive multifocal leukoencephalopathy, and birth defects.  The patient understands that monitoring is required including a baseline creatinine and regular CBC testing. In addition, patient must alert us immediately if symptoms of infection or other concerning signs are noted. High Dose Vitamin A Counseling: Side effects reviewed, pt to contact office should one occur. Albendazole Pregnancy And Lactation Text: This medication is Pregnancy Category C and it isn't known if it is safe during pregnancy. It is also excreted in breast milk. Itraconazole Counseling:  I discussed with the patient the risks of itraconazole including but not limited to liver damage, nausea/vomiting, neuropathy, and severe allergy.  The patient understands that this medication is best absorbed when taken with acidic beverages such as non-diet cola or ginger ale.  The patient understands that monitoring is required including baseline LFTs and repeat LFTs at intervals.  The patient understands that they are to contact us or the primary physician if concerning signs are noted. Cellcept Pregnancy And Lactation Text: This medication is Pregnancy Category D and isn't considered safe during pregnancy. It is unknown if this medication is excreted in breast milk. Hydroquinone Pregnancy And Lactation Text: This medication has not been assigned a Pregnancy Risk Category but animal studies failed to show danger with the topical medication. It is unknown if the medication is excreted in breast milk. Otezla Pregnancy And Lactation Text: This medication is Pregnancy Category C and it isn't known if it is safe during pregnancy. It is unknown if it is excreted in breast milk. Doxycycline Counseling:  Patient counseled regarding possible photosensitivity and increased risk for sunburn.  Patient instructed to avoid sunlight, if possible.  When exposed to sunlight, patients should wear protective clothing, sunglasses, and sunscreen.  The patient was instructed to call the office immediately if the following severe adverse effects occur:  hearing changes, easy bruising/bleeding, severe headache, or vision changes.  The patient verbalized understanding of the proper use and possible adverse effects of doxycycline.  All of the patient's questions and concerns were addressed. Tetracycline Pregnancy And Lactation Text: This medication is Pregnancy Category D and not consider safe during pregnancy. It is also excreted in breast milk. Rifampin Counseling: I discussed with the patient the risks of rifampin including but not limited to liver damage, kidney damage, red-orange body fluids, nausea/vomiting and severe allergy. Cosentyx Counseling:  I discussed with the patient the risks of Cosentyx including but not limited to worsening of Crohn's disease, immunosuppression, allergic reactions and infections.  The patient understands that monitoring is required including a PPD at baseline and must alert us or the primary physician if symptoms of infection or other concerning signs are noted. Valtrex Counseling: I discussed with the patient the risks of valacyclovir including but not limited to kidney damage, nausea, vomiting and severe allergy.  The patient understands that if the infection seems to be worsening or is not improving, they are to call. Odomzo Counseling- I discussed with the patient the risks of Odomzo including but not limited to nausea, vomiting, diarrhea, constipation, weight loss, changes in the sense of taste, decreased appetite, muscle spasms, and hair loss.  The patient verbalized understanding of the proper use and possible adverse effects of Odomzo.  All of the patient's questions and concerns were addressed. Elidel Pregnancy And Lactation Text: This medication is Pregnancy Category C. It is unknown if this medication is excreted in breast milk. Metronidazole Counseling:  I discussed with the patient the risks of metronidazole including but not limited to seizures, nausea/vomiting, a metallic taste in the mouth, nausea/vomiting and severe allergy. Tetracycline Counseling: Patient counseled regarding possible photosensitivity and increased risk for sunburn.  Patient instructed to avoid sunlight, if possible.  When exposed to sunlight, patients should wear protective clothing, sunglasses, and sunscreen.  The patient was instructed to call the office immediately if the following severe adverse effects occur:  hearing changes, easy bruising/bleeding, severe headache, or vision changes.  The patient verbalized understanding of the proper use and possible adverse effects of tetracycline.  All of the patient's questions and concerns were addressed. Patient understands to avoid pregnancy while on therapy due to potential birth defects. Cephalexin Pregnancy And Lactation Text: This medication is Pregnancy Category B and considered safe during pregnancy.  It is also excreted in breast milk but can be used safely for shorter doses. Nsaids Counseling: NSAID Counseling: I discussed with the patient that NSAIDs should be taken with food. Prolonged use of NSAIDs can result in the development of stomach ulcers.  Patient advised to stop taking NSAIDs if abdominal pain occurs.  The patient verbalized understanding of the proper use and possible adverse effects of NSAIDs.  All of the patient's questions and concerns were addressed. Dupixent Pregnancy And Lactation Text: This medication likely crosses the placenta but the risk for the fetus is uncertain. This medication is excreted in breast milk. Ketoconazole Pregnancy And Lactation Text: This medication is Pregnancy Category C and it isn't know if it is safe during pregnancy. It is also excreted in breast milk and breast feeding isn't recommended. Fluconazole Counseling:  Patient counseled regarding adverse effects of fluconazole including but not limited to headache, diarrhea, nausea, upset stomach, liver function test abnormalities, taste disturbance, and stomach pain.  There is a rare possibility of liver failure that can occur when taking fluconazole.  The patient understands that monitoring of LFTs and kidney function test may be required, especially at baseline. The patient verbalized understanding of the proper use and possible adverse effects of fluconazole.  All of the patient's questions and concerns were addressed. Methotrexate Counseling:  Patient counseled regarding adverse effects of methotrexate including but not limited to nausea, vomiting, abnormalities in liver function tests. Patients may develop mouth sores, rash, diarrhea, and abnormalities in blood counts. The patient understands that monitoring is required including LFT's and blood counts.  There is a rare possibility of scarring of the liver and lung problems that can occur when taking methotrexate. Persistent nausea, loss of appetite, pale stools, dark urine, cough, and shortness of breath should be reported immediately. Patient advised to discontinue methotrexate treatment at least three months before attempting to become pregnant.  I discussed the need for folate supplements while taking methotrexate.  These supplements can decrease side effects during methotrexate treatment. The patient verbalized understanding of the proper use and possible adverse effects of methotrexate.  All of the patient's questions and concerns were addressed. Doxepin Counseling:  Patient advised that the medication is sedating and not to drive a car after taking this medication. Patient informed of potential adverse effects including but not limited to dry mouth, urinary retention, and blurry vision.  The patient verbalized understanding of the proper use and possible adverse effects of doxepin.  All of the patient's questions and concerns were addressed. Azathioprine Counseling:  I discussed with the patient the risks of azathioprine including but not limited to myelosuppression, immunosuppression, hepatotoxicity, lymphoma, and infections.  The patient understands that monitoring is required including baseline LFTs, Creatinine, possible TPMP genotyping and weekly CBCs for the first month and then every 2 weeks thereafter.  The patient verbalized understanding of the proper use and possible adverse effects of azathioprine.  All of the patient's questions and concerns were addressed. Acitretin Pregnancy And Lactation Text: This medication is Pregnancy Category X and should not be given to women who are pregnant or may become pregnant in the future. This medication is excreted in breast milk. Cyclosporine Counseling:  I discussed with the patient the risks of cyclosporine including but not limited to hypertension, gingival hyperplasia,myelosuppression, immunosuppression, liver damage, kidney damage, neurotoxicity, lymphoma, and serious infections. The patient understands that monitoring is required including baseline blood pressure, CBC, CMP, lipid panel and uric acid, and then 1-2 times monthly CMP and blood pressure. Griseofulvin Pregnancy And Lactation Text: This medication is Pregnancy Category X and is known to cause serious birth defects. It is unknown if this medication is excreted in breast milk but breast feeding should be avoided. Bactrim Counseling:  I discussed with the patient the risks of sulfa antibiotics including but not limited to GI upset, allergic reaction, drug rash, diarrhea, dizziness, photosensitivity, and yeast infections.  Rarely, more serious reactions can occur including but not limited to aplastic anemia, agranulocytosis, methemoglobinemia, blood dyscrasias, liver or kidney failure, lung infiltrates or desquamative/blistering drug rashes. Solaraze Pregnancy And Lactation Text: This medication is Pregnancy Category B and is considered safe. There is some data to suggest avoiding during the third trimester. It is unknown if this medication is excreted in breast milk. Drysol Pregnancy And Lactation Text: This medication is considered safe during pregnancy and breast feeding. Erythromycin Pregnancy And Lactation Text: This medication is Pregnancy Category B and is considered safe during pregnancy. It is also excreted in breast milk. Rituxan Pregnancy And Lactation Text: This medication is Pregnancy Category C and it isn't know if it is safe during pregnancy. It is unknown if this medication is excreted in breast milk but similar antibodies are known to be excreted. Bexarotene Counseling:  I discussed with the patient the risks of bexarotene including but not limited to hair loss, dry lips/skin/eyes, liver abnormalities, hyperlipidemia, pancreatitis, depression/suicidal ideation, photosensitivity, drug rash/allergic reactions, hypothyroidism, anemia, leukopenia, infection, cataracts, and teratogenicity.  Patient understands that they will need regular blood tests to check lipid profile, liver function tests, white blood cell count, thyroid function tests and pregnancy test if applicable. Quinolones Counseling:  I discussed with the patient the risks of fluoroquinolones including but not limited to GI upset, allergic reaction, drug rash, diarrhea, dizziness, photosensitivity, yeast infections, liver function test abnormalities, tendonitis/tendon rupture. Zyclara Counseling:  I discussed with the patient the risks of imiquimod including but not limited to erythema, scaling, itching, weeping, crusting, and pain.  Patient understands that the inflammatory response to imiquimod is variable from person to person and was educated regarded proper titration schedule.  If flu-like symptoms develop, patient knows to discontinue the medication and contact us. Methotrexate Pregnancy And Lactation Text: This medication is Pregnancy Category X and is known to cause fetal harm. This medication is excreted in breast milk. Isotretinoin Counseling: Patient should get monthly blood tests, not donate blood, not drive at night if vision affected, not share medication, and not undergo elective surgery for 6 months after tx completed. Side effects reviewed, pt to contact office should one occur. Odomzo Pregnancy And Lactation Text: This medication is Pregnancy Category X and is absolutely contraindicated during pregnancy. It is unknown if it is excreted in breast milk. Isotretinoin Pregnancy And Lactation Text: This medication is Pregnancy Category X and is considered extremely dangerous during pregnancy. It is unknown if it is excreted in breast milk. Benzoyl Peroxide Counseling: Patient counseled that medicine may cause skin irritation and bleach clothing.  In the event of skin irritation, the patient was advised to reduce the amount of the drug applied or use it less frequently.   The patient verbalized understanding of the proper use and possible adverse effects of benzoyl peroxide.  All of the patient's questions and concerns were addressed. Hydroxyzine Pregnancy And Lactation Text: This medication is not safe during pregnancy and should not be taken. It is also excreted in breast milk and breast feeding isn't recommended. Doxepin Pregnancy And Lactation Text: This medication is Pregnancy Category C and it isn't known if it is safe during pregnancy. It is also excreted in breast milk and breast feeding isn't recommended. Benzoyl Peroxide Pregnancy And Lactation Text: This medication is Pregnancy Category C. It is unknown if benzoyl peroxide is excreted in breast milk. Cimetidine Counseling:  I discussed with the patient the risks of Cimetidine including but not limited to gynecomastia, headache, diarrhea, nausea, drowsiness, arrhythmias, pancreatitis, skin rashes, psychosis, bone marrow suppression and kidney toxicity. Fluconazole Pregnancy And Lactation Text: This medication is Pregnancy Category C and it isn't know if it is safe during pregnancy. It is also excreted in breast milk. Topical Clindamycin Counseling: Patient counseled that this medication may cause skin irritation or allergic reactions.  In the event of skin irritation, the patient was advised to reduce the amount of the drug applied or use it less frequently.   The patient verbalized understanding of the proper use and possible adverse effects of clindamycin.  All of the patient's questions and concerns were addressed. Birth Control Pills Counseling: Birth Control Pill Counseling: I discussed with the patient the potential side effects of OCPs including but not limited to increased risk of stroke, heart attack, thrombophlebitis, deep venous thrombosis, hepatic adenomas, breast changes, GI upset, headaches, and depression.  The patient verbalized understanding of the proper use and possible adverse effects of OCPs. All of the patient's questions and concerns were addressed. Topical Sulfur Applications Counseling: Topical Sulfur Counseling: Patient counseled that this medication may cause skin irritation or allergic reactions.  In the event of skin irritation, the patient was advised to reduce the amount of the drug applied or use it less frequently.   The patient verbalized understanding of the proper use and possible adverse effects of topical sulfur application.  All of the patient's questions and concerns were addressed. Griseofulvin Counseling:  I discussed with the patient the risks of griseofulvin including but not limited to photosensitivity, cytopenia, liver damage, nausea/vomiting and severe allergy.  The patient understands that this medication is best absorbed when taken with a fatty meal (e.g., ice cream or french fries). Simponi Pregnancy And Lactation Text: The risk during pregnancy and breastfeeding is uncertain with this medication. Gabapentin Counseling: I discussed with the patient the risks of gabapentin including but not limited to dizziness, somnolence, fatigue and ataxia. Arava Counseling:  Patient counseled regarding adverse effects of Arava including but not limited to nausea, vomiting, abnormalities in liver function tests. Patients may develop mouth sores, rash, diarrhea, and abnormalities in blood counts. The patient understands that monitoring is required including LFTs and blood counts.  There is a rare possibility of scarring of the liver and lung problems that can occur when taking methotrexate. Persistent nausea, loss of appetite, pale stools, dark urine, cough, and shortness of breath should be reported immediately. Patient advised to discontinue Arava treatment and consult with a physician prior to attempting conception. The patient will have to undergo a treatment to eliminate Arava from the body prior to conception. Protopic Pregnancy And Lactation Text: This medication is Pregnancy Category C. It is unknown if this medication is excreted in breast milk when applied topically. Topical Clindamycin Pregnancy And Lactation Text: This medication is Pregnancy Category B and is considered safe during pregnancy. It is unknown if it is excreted in breast milk. Rifampin Pregnancy And Lactation Text: This medication is Pregnancy Category C and it isn't know if it is safe during pregnancy. It is also excreted in breast milk and should not be used if you are breast feeding. 5-Fu Counseling: 5-Fluorouracil Counseling:  I discussed with the patient the risks of 5-fluorouracil including but not limited to erythema, scaling, itching, weeping, crusting, and pain. Metronidazole Pregnancy And Lactation Text: This medication is Pregnancy Category B and considered safe during pregnancy.  It is also excreted in breast milk. Spironolactone Counseling: Patient advised regarding risks of diarrhea, abdominal pain, hyperkalemia, birth defects (for female patients), liver toxicity and renal toxicity. The patient may need blood work to monitor liver and kidney function and potassium levels while on therapy. The patient verbalized understanding of the proper use and possible adverse effects of spironolactone.  All of the patient's questions and concerns were addressed. Hydroxychloroquine Pregnancy And Lactation Text: This medication has been shown to cause fetal harm but it isn't assigned a Pregnancy Risk Category. There are small amounts excreted in breast milk. Albendazole Counseling:  I discussed with the patient the risks of albendazole including but not limited to cytopenia, kidney damage, nausea/vomiting and severe allergy.  The patient understands that this medication is being used in an off-label manner. Ketoconazole Counseling:   Patient counseled regarding improving absorption with orange juice.  Adverse effects include but are not limited to breast enlargement, headache, diarrhea, nausea, upset stomach, liver function test abnormalities, taste disturbance, and stomach pain.  There is a rare possibility of liver failure that can occur when taking ketoconazole. The patient understands that monitoring of LFTs may be required, especially at baseline. The patient verbalized understanding of the proper use and possible adverse effects of ketoconazole.  All of the patient's questions and concerns were addressed. Eucrisa Counseling: Patient may experience a mild burning sensation during topical application. Eucrisa is not approved in children less than 2 years of age. Tazorac Counseling:  Patient advised that medication is irritating and drying.  Patient may need to apply sparingly and wash off after an hour before eventually leaving it on overnight.  The patient verbalized understanding of the proper use and possible adverse effects of tazorac.  All of the patient's questions and concerns were addressed. Colchicine Pregnancy And Lactation Text: This medication is Pregnancy Category C and isn't considered safe during pregnancy. It is excreted in breast milk. Carac Counseling:  I discussed with the patient the risks of Carac including but not limited to erythema, scaling, itching, weeping, crusting, and pain. Carac Pregnancy And Lactation Text: This medication is Pregnancy Category X and contraindicated in pregnancy and in women who may become pregnant. It is unknown if this medication is excreted in breast milk. Glycopyrrolate Pregnancy And Lactation Text: This medication is Pregnancy Category B and is considered safe during pregnancy. It is unknown if it is excreted breast milk. Thalidomide Counseling: I discussed with the patient the risks of thalidomide including but not limited to birth defects, anxiety, weakness, chest pain, dizziness, cough and severe allergy. Doxycycline Pregnancy And Lactation Text: This medication is Pregnancy Category D and not consider safe during pregnancy. It is also excreted in breast milk but is considered safe for shorter treatment courses. Topical Retinoid counseling:  Patient advised to apply a pea-sized amount only at bedtime and wait 30 minutes after washing their face before applying.  If too drying, patient may add a non-comedogenic moisturizer. The patient verbalized understanding of the proper use and possible adverse effects of retinoids.  All of the patient's questions and concerns were addressed. Elidel Counseling: Patient may experience a mild burning sensation during topical application. Elidel is not approved in children less than 2 years of age. There have been case reports of hematologic and skin malignancies in patients using topical calcineurin inhibitors although causality is questionable. Xolair Pregnancy And Lactation Text: This medication is Pregnancy Category B and is considered safe during pregnancy. This medication is excreted in breast milk. Topical Sulfur Applications Pregnancy And Lactation Text: This medication is Pregnancy Category C and has an unknown safety profile during pregnancy. It is unknown if this topical medication is excreted in breast milk. Ivermectin Counseling:  Patient instructed to take medication on an empty stomach with a full glass of water.  Patient informed of potential adverse effects including but not limited to nausea, diarrhea, dizziness, itching, and swelling of the extremities or lymph nodes.  The patient verbalized understanding of the proper use and possible adverse effects of ivermectin.  All of the patient's questions and concerns were addressed. Terbinafine Counseling: Patient counseling regarding adverse effects of terbinafine including but not limited to headache, diarrhea, rash, upset stomach, liver function test abnormalities, itching, taste/smell disturbance, nausea, abdominal pain, and flatulence.  There is a rare possibility of liver failure that can occur when taking terbinafine.  The patient understands that a baseline LFT and kidney function test may be required. The patient verbalized understanding of the proper use and possible adverse effects of terbinafine.  All of the patient's questions and concerns were addressed. Minocycline Counseling: Patient advised regarding possible photosensitivity and discoloration of the teeth, skin, lips, tongue and gums.  Patient instructed to avoid sunlight, if possible.  When exposed to sunlight, patients should wear protective clothing, sunglasses, and sunscreen.  The patient was instructed to call the office immediately if the following severe adverse effects occur:  hearing changes, easy bruising/bleeding, severe headache, or vision changes.  The patient verbalized understanding of the proper use and possible adverse effects of minocycline.  All of the patient's questions and concerns were addressed. Oxybutynin Counseling:  I discussed with the patient the risks of oxybutynin including but not limited to skin rash, drowsiness, dry mouth, difficulty urinating, and blurred vision. Erythromycin Counseling:  I discussed with the patient the risks of erythromycin including but not limited to GI upset, allergic reaction, drug rash, diarrhea, increase in liver enzymes, and yeast infections. Drysol Counseling:  I discussed with the patient the risks of drysol/aluminum chloride including but not limited to skin rash, itching, irritation, burning. Hydroquinone Counseling:  Patient advised that medication may result in skin irritation, lightening (hypopigmentation), dryness, and burning.  In the event of skin irritation, the patient was advised to reduce the amount of the drug applied or use it less frequently.  Rarely, spots that are treated with hydroquinone can become darker (pseudoochronosis).  Should this occur, patient instructed to stop medication and call the office. The patient verbalized understanding of the proper use and possible adverse effects of hydroquinone.  All of the patient's questions and concerns were addressed. Azithromycin Pregnancy And Lactation Text: This medication is considered safe during pregnancy and is also secreted in breast milk. Nsaids Pregnancy And Lactation Text: These medications are considered safe up to 30 weeks gestation. It is excreted in breast milk. Otezla Counseling: The side effects of Otezla were discussed with the patient, including but not limited to worsening or new depression, weight loss, diarrhea, nausea, upper respiratory tract infection, and headache. Patient instructed to call the office should any adverse effect occur.  The patient verbalized understanding of the proper use and possible adverse effects of Otezla.  All the patient's questions and concerns were addressed. Solaraze Counseling:  I discussed with the patient the risks of Solaraze including but not limited to erythema, scaling, itching, weeping, crusting, and pain. Include Pregnancy/Lactation Warning?: No Dapsone Pregnancy And Lactation Text: This medication is Pregnancy Category C and is not considered safe during pregnancy or breast feeding. Stelara Counseling:  I discussed with the patient the risks of ustekinumab including but not limited to immunosuppression, malignancy, posterior leukoencephalopathy syndrome, and serious infections.  The patient understands that monitoring is required including a PPD at baseline and must alert us or the primary physician if symptoms of infection or other concerning signs are noted. Clofazimine Counseling:  I discussed with the patient the risks of clofazimine including but not limited to skin and eye pigmentation, liver damage, nausea/vomiting, gastrointestinal bleeding and allergy. Infliximab Counseling:  I discussed with the patient the risks of infliximab including but not limited to myelosuppression, immunosuppression, autoimmune hepatitis, demyelinating diseases, lymphoma, and serious infections.  The patient understands that monitoring is required including a PPD at baseline and must alert us or the primary physician if symptoms of infection or other concerning signs are noted. Enbrel Counseling:  I discussed with the patient the risks of etanercept including but not limited to myelosuppression, immunosuppression, autoimmune hepatitis, demyelinating diseases, lymphoma, and infections.  The patient understands that monitoring is required including a PPD at baseline and must alert us or the primary physician if symptoms of infection or other concerning signs are noted. Hydroxychloroquine Counseling:  I discussed with the patient that a baseline ophthalmologic exam is needed at the start of therapy and every year thereafter while on therapy. A CBC may also be warranted for monitoring.  The side effects of this medication were discussed with the patient, including but not limited to agranulocytosis, aplastic anemia, seizures, rashes, retinopathy, and liver toxicity. Patient instructed to call the office should any adverse effect occur.  The patient verbalized understanding of the proper use and possible adverse effects of Plaquenil.  All the patient's questions and concerns were addressed. Tremfya Counseling: I discussed with the patient the risks of guselkumab including but not limited to immunosuppression, serious infections, worsening of inflammatory bowel disease and drug reactions.  The patient understands that monitoring is required including a PPD at baseline and must alert us or the primary physician if symptoms of infection or other concerning signs are noted. Protopic Counseling: Patient may experience a mild burning sensation during topical application. Protopic is not approved in children less than 2 years of age. There have been case reports of hematologic and skin malignancies in patients using topical calcineurin inhibitors although causality is questionable. Erivedge Counseling- I discussed with the patient the risks of Erivedge including but not limited to nausea, vomiting, diarrhea, constipation, weight loss, changes in the sense of taste, decreased appetite, muscle spasms, and hair loss.  The patient verbalized understanding of the proper use and possible adverse effects of Erivedge.  All of the patient's questions and concerns were addressed. Cephalexin Counseling: I counseled the patient regarding use of cephalexin as an antibiotic for prophylactic and/or therapeutic purposes. Cephalexin (commonly prescribed under brand name Keflex) is a cephalosporin antibiotic which is active against numerous classes of bacteria, including most skin bacteria. Side effects may include nausea, diarrhea, gastrointestinal upset, rash, hives, yeast infections, and in rare cases, hepatitis, kidney disease, seizures, fever, confusion, neurologic symptoms, and others. Patients with severe allergies to penicillin medications are cautioned that there is about a 10% incidence of cross-reactivity with cephalosporins. When possible, patients with penicillin allergies should use alternatives to cephalosporins for antibiotic therapy. Acitretin Counseling:  I discussed with the patient the risks of acitretin including but not limited to hair loss, dry lips/skin/eyes, liver damage, hyperlipidemia, depression/suicidal ideation, photosensitivity.  Serious rare side effects can include but are not limited to pancreatitis, pseudotumor cerebri, bony changes, clot formation/stroke/heart attack.  Patient understands that alcohol is contraindicated since it can result in liver toxicity and significantly prolong the elimination of the drug by many years. Tazorac Pregnancy And Lactation Text: This medication is not safe during pregnancy. It is unknown if this medication is excreted in breast milk. Taltz Counseling: I discussed with the patient the risks of ixekizumab including but not limited to immunosuppression, serious infections, worsening of inflammatory bowel disease and drug reactions.  The patient understands that monitoring is required including a PPD at baseline and must alert us or the primary physician if symptoms of infection or other concerning signs are noted. Xeljanz Counseling: I discussed with the patient the risks of Xeljanz therapy including increased risk of infection, liver issues, headache, diarrhea, or cold symptoms. Live vaccines should be avoided. They were instructed to call if they have any problems. Minoxidil Counseling: Minoxidil is a topical medication which can increase blood flow where it is applied. It is uncertain how this medication increases hair growth. Side effects are uncommon and include stinging and allergic reactions. Prednisone Counseling:  I discussed with the patient the risks of prolonged use of prednisone including but not limited to weight gain, insomnia, osteoporosis, mood changes, diabetes, susceptibility to infection, glaucoma and high blood pressure.  In cases where prednisone use is prolonged, patients should be monitored with blood pressure checks, serum glucose levels and an eye exam.  Additionally, the patient may need to be placed on GI prophylaxis, PCP prophylaxis, and calcium and vitamin D supplementation and/or a bisphosphonate.  The patient verbalized understanding of the proper use and the possible adverse effects of prednisone.  All of the patient's questions and concerns were addressed. Picato Counseling:  I discussed with the patient the risks of Picato including but not limited to erythema, scaling, itching, weeping, crusting, and pain. Imiquimod Counseling:  I discussed with the patient the risks of imiquimod including but not limited to erythema, scaling, itching, weeping, crusting, and pain.  Patient understands that the inflammatory response to imiquimod is variable from person to person and was educated regarded proper titration schedule.  If flu-like symptoms develop, patient knows to discontinue the medication and contact us. Birth Control Pills Pregnancy And Lactation Text: This medication should be avoided if pregnant and for the first 30 days post-partum. Bexarotene Pregnancy And Lactation Text: This medication is Pregnancy Category X and should not be given to women who are pregnant or may become pregnant. This medication should not be used if you are breast feeding. Azithromycin Counseling:  I discussed with the patient the risks of azithromycin including but not limited to GI upset, allergic reaction, drug rash, diarrhea, and yeast infections. Humira Counseling:  I discussed with the patient the risks of adalimumab including but not limited to myelosuppression, immunosuppression, autoimmune hepatitis, demyelinating diseases, lymphoma, and serious infections.  The patient understands that monitoring is required including a PPD at baseline and must alert us or the primary physician if symptoms of infection or other concerning signs are noted. Sski Pregnancy And Lactation Text: This medication is Pregnancy Category D and isn't considered safe during pregnancy. It is excreted in breast milk.

## 2023-10-21 NOTE — ED PROVIDER NOTE - CLINICAL SUMMARY MEDICAL DECISION MAKING FREE TEXT BOX
23 F  at 5w6d presents with POUL vs L adnexal ectopic, bhcg 764 to 404 (no mtx as downtrending) here for rpt hcg.  reports mild cramping and spotting since IUD removal.  otherwise feeling well.  pt vss, well appearing, abd soft/nt.  will rpt hcg and c/s gyn    downtrending hcg.  no need for mtx.  return in 2 days for rpt beta.  discussed strict return parameters

## 2023-10-21 NOTE — CONSULT NOTE ADULT - SUBJECTIVE AND OBJECTIVE BOX
23y  at 5w6d w/ (questionable) LMP around 9/10 presenting for repeat bhcg due to Pregnancy of unknown location. Pt came in 2d ago with vaginal bleeding and cramping that resolved upon arrival, at that time pt had a bhcg of 764 and POUL on US, pt had IUD removed at that time.  Pt came back today for follow up bHCG. Patient reports mild cramping, no strong abdominal pain, and is still using 1-2 pads/day (not soaked through) since her IUD was removed.     OB H/x: G1 current  GYN H/x: Has regular periods w/ IUD in place. (Mirena IUD in place for past 3 years) removed on 10/17   MED H/x: denies  SURG H/x: knee surgery as a child, no hardware  Medications: none  Allergies: metronidazole (rash, facial swelling)  social: denies    PHYSICAL EXAM:   Vital Signs Last 24 Hrs  T(C): 36.9 (21 Oct 2023 13:09), Max: 36.9 (21 Oct 2023 13:06)  T(F): 98.5 (21 Oct 2023 13:09), Max: 98.5 (21 Oct 2023 13:06)  HR: 83 (21 Oct 2023 13:09) (83 - 83)  BP: 128/80 (21 Oct 2023 13:09) (128/80 - 128/80)  BP(mean): --  RR: 18 (21 Oct 2023 13:09) (18 - 18)  SpO2: 98% (21 Oct 2023 13:09) (98% - 98%)    Parameters below as of 21 Oct 2023 13:09  Patient On (Oxygen Delivery Method): room air      Gen: Alert, comfortable NAD  Pulm: Comfortable on RA  Ext: WNL  Abd: Soft, no tenderness to palpation    LABS:    HCG Quantitative, Serum: 208 mIU/mL (10- @ 13:17)

## 2023-10-21 NOTE — ED PROVIDER NOTE - OBJECTIVE STATEMENT
23 F  at 5w6d presents with POUL vs L adnexal ectopic, bhcg 764 to 404 (no mtx as downtrending) here for rpt hcg. pt reports occasional pelvic cramping/spotting since IUD removed but otherwise denies any pain and feeling well.  denies f/c, HA, dizziness, fainting, nv, urinary sxs, trauma

## 2023-10-21 NOTE — ED PROVIDER NOTE - PATIENT PORTAL LINK FT
You can access the FollowMyHealth Patient Portal offered by [facility] by registering at the following website: [email or web address]. By joining [employer]’s FollowMyHealth portal, you will also be able to view your health information using other applications (apps) compatible with our system.

## 2023-10-23 ENCOUNTER — EMERGENCY (EMERGENCY)
Facility: HOSPITAL | Age: 23
LOS: 1 days | Discharge: HOME | End: 2023-10-23
Admitting: STUDENT IN AN ORGANIZED HEALTH CARE EDUCATION/TRAINING PROGRAM
Payer: COMMERCIAL

## 2023-10-23 VITALS
TEMPERATURE: 99 F | WEIGHT: 160.06 LBS | OXYGEN SATURATION: 98 % | HEIGHT: 64 IN | HEART RATE: 90 BPM | RESPIRATION RATE: 18 BRPM | DIASTOLIC BLOOD PRESSURE: 78 MMHG | SYSTOLIC BLOOD PRESSURE: 124 MMHG

## 2023-10-23 PROBLEM — Z78.9 OTHER SPECIFIED HEALTH STATUS: Chronic | Status: ACTIVE | Noted: 2023-10-21

## 2023-10-23 LAB
HCG SERPL-ACNC: 113 MIU/ML — HIGH
HCG SERPL-ACNC: 113 MIU/ML — HIGH

## 2023-10-23 PROCEDURE — 84702 CHORIONIC GONADOTROPIN TEST: CPT

## 2023-10-23 PROCEDURE — 99283 EMERGENCY DEPT VISIT LOW MDM: CPT

## 2023-10-23 PROCEDURE — 36415 COLL VENOUS BLD VENIPUNCTURE: CPT

## 2023-10-23 NOTE — PROGRESS NOTE ADULT - ASSESSMENT
24yo  at 5w2d by questionable LMP of 9/10 presents w/ vaginal bleeding, minimal cramping, found to have L adenexal ectopic pregnancy. Undesired pregnancy.     [] Ectopic pregnancy (no evidence of current rupture)   - beta level today 113, downtrending appropriately  - Patient counseled on risk of ectopic pregnancy and understands the need for close follow up   - Precautions reviewed: Patient to return here ASAP or to nearest ED If any sharp abdominal pain, heavy bleeding, or lightheadedness/dizziness  - Pelvic rest   - Follow up with Dr. Hansen on Wednesday at [facility]      Anrdes Cruz PA-C   Discussed w/ Dr. Hansen

## 2023-10-23 NOTE — PROGRESS NOTE ADULT - SUBJECTIVE AND OBJECTIVE BOX
23y  at 5w6d w/ (questionable) LMP around 9/10 presenting for repeat bhcg due to ectopic pregnancy. Pt initially presenting with vaginal bleeding and cramping, and her IUD was removed at that time. Her cramping has resolved, but she is still having some vaginal bleeding, that is consistent, but mot using more than 1-2pads a day. She denies headaches, dizziness, n/v, abdominal pain, or fevers.      Beta levels: 764 (10/17) > 404 (10/19) > 208 (10/21) > ...      OB H/x: G1 current  GYN H/x: Has regular periods, s/p IUD (Mirena IUD in place for past 3 years) removed on 10/17   MED H/x: denies  SURG H/x: knee surgery as a child, no hardware  Medications: none  Allergies: metronidazole (rash, facial swelling)  social: denies    PHYSICAL EXAM:   Vital Signs Last 24 Hrs  T(C): 37.3 (23 Oct 2023 14:10), Max: 37.3 (23 Oct 2023 14:10)  T(F): 99.2 (23 Oct 2023 14:10), Max: 99.2 (23 Oct 2023 14:10)  HR: 90 (23 Oct 2023 14:10) (90 - 90)  BP: 124/78 (23 Oct 2023 14:10) (124/78 - 124/78)  BP(mean): --  RR: 18 (23 Oct 2023 14:10) (18 - 18)  SpO2: 98% (23 Oct 2023 14:10) (98% - 98%)    Parameters below as of 23 Oct 2023 14:10  Patient On (Oxygen Delivery Method): room air    Gen: Alert, comfortable NAD  Pulm: Comfortable on RA  Cardio: RRR  Ext: WNL, no edema or tenderness   Abd: Soft, not tender to palpation    LABS:  HCG Quantitative, Serum: 113 mIU/mL (10-23 @ 14:14)

## 2023-10-23 NOTE — ED PROVIDER NOTE - NSFOLLOWUPINSTRUCTIONS_ED_ALL_ED_FT
Please follow up with Dr. Hansen outpatient.  Please return to ED if you are having heavy vaginal bleeding (more than 1 pad per hour for >2 hrs and/or severe abdominal pain).

## 2023-10-23 NOTE — ED PROVIDER NOTE - CARE PROVIDER_API CALL
Javan Hansen  Obstetrics and Gynecology  [address] [address]  Phone: [phone]  Fax: [phone]  Follow Up Time:

## 2023-10-23 NOTE — ED ADULT NURSE NOTE - OBJECTIVE STATEMENT
pt A&Ox4 and able to speak in complete sentences. pt breathing even and unlabored with equal chest rise and fall. pt arrived for repeat hcg check. denies cp, n, v, lightheadedness, dizziness, sob, fevers, chills. pt denies abd pain.

## 2023-10-23 NOTE — ED ADULT NURSE NOTE - NSFALLLASTSIX_ED_ALL_ED
Take Allegra twice per day for the next 5 days.  Add Flonase (fluticasone) nasal spray once daily.  Try doing nasal saline rinse three times daily.  Add Mucinex (guaifenesis) twice daily.  DRINK LOTS OF WATER.   No No.

## 2023-10-23 NOTE — ED PROVIDER NOTE - OBJECTIVE STATEMENT
24 y/o f presents for repeat hcg which has been downtrending over the past few visits, being evaluated for POUL.  Pt stating she feels well currently, has no pain, mild bleeding about 2 pads per day.

## 2023-10-23 NOTE — ED PROVIDER NOTE - CLINICAL SUMMARY MEDICAL DECISION MAKING FREE TEXT BOX
24 y/o f currently being evaluated for POUL presents for repeat hcg.  Vitals in ED unremarkable, nontender abd, minimal bleeding.  HCG downtrending from 208 to 113.  GYN evaluated in ED and report pt stable for d/c, can f/u with Dr. Hansen outpatient to trend hcg to 0, advised to return to ED if having worsening bleeding, severe pain.

## 2023-10-23 NOTE — ED PROVIDER NOTE - PATIENT PORTAL LINK FT
You can access the FollowMyHealth Patient Portal offered by [facility] by registering at the following website: [email or web address]. By joining [employer]’s FollowMyHealth portal, you will also be able to view your health information using other applications (apps) compatible with our system.

## 2023-10-23 NOTE — ED PROVIDER NOTE - CONSTITUTIONAL [-], MLM
no fever/no chills Rifampin Counseling: I discussed with the patient the risks of rifampin including but not limited to liver damage, kidney damage, red-orange body fluids, nausea/vomiting and severe allergy.

## 2023-10-23 NOTE — ED ADULT TRIAGE NOTE - CHIEF COMPLAINT QUOTE
Pt presents to ED here for hcg check after being diagnosed with ectopic pregnancy. Denies any other complaints. Pt A&Ox4, conversive in full sentences and ambulates.

## 2023-10-23 NOTE — ED ADULT NURSE NOTE - NS ED NURSE LEVEL OF CONSCIOUSNESS ORIENTATION
[facility] Transitions Follow Up Call    2021    Patient: Harpreet Gray  Patient : 1952   MRN: [id number]  Reason for Admission: COPD exacerbation  Discharge Date: 7/3/21 RARS: Readmission Risk Score: 29         Spoke with: [address] [place] [address] Transitions Subsequent and Final Call    Subsequent and Final Calls  Do you have any ongoing symptoms?: No  Have your medications changed?: Yes  Do you have any questions related to your medications?: No  Do you currently have any active services?: Yes  Are you currently active with any services?: Home Health  Do you have any needs or concerns that I can assist you with?: No  Identified Barriers: None  Care Transitions Interventions  Other Interventions: Follow Up: Patient reports that she is doing \"OK, but having a lot of pain above my left knee\". Physical therapist and Dr. Marcus Finch are aware. Dr. Marcus Finch ordered tizanidine with no relief, then ordered flexeril and patient started that today. CTN explained that she may need to see Dr. Marcus Finch and have him evaluate her to determine if she needs to see an orthopedic MD.  She verbalized understanding and reported that the PT was there today and is planning on  Contacting Dr. Marcus Finch. CTN will continue with outreach follow up calls.   Future Appointments   Date Time Provider Kia Guadarrama   2021  2:00 PM Linh Wilcox MD [facility]   10/22/2021  9:30 AM MD TATIANA Villalobos [place] [facility]   12/15/2021  8:30 AM Cooper Miller MD [facility] YUDELKA England RN Oriented - self; Oriented - place; Oriented - time/Age appropriate behavior

## 2023-10-23 NOTE — ED ADULT NURSE NOTE - NSFALLUNIVINTERV_ED_ALL_ED
Bed/Stretcher in lowest position, wheels locked, appropriate side rails in place/Call bell, personal items and telephone in reach/Instruct patient to call for assistance before getting out of bed/chair/stretcher/Non-slip footwear applied when patient is off stretcher/[place] to call system/Physically safe environment - no spills, clutter or unnecessary equipment/Purposeful proactive rounding/Room/bathroom lighting operational, light cord in reach

## 2023-10-27 DIAGNOSIS — O36.80X0 PREGNANCY WITH INCONCLUSIVE FETAL VIABILITY, NOT APPLICABLE OR UNSPECIFIED: ICD-10-CM

## 2023-10-27 DIAGNOSIS — O46.90 ANTEPARTUM HEMORRHAGE, UNSPECIFIED, UNSPECIFIED TRIMESTER: ICD-10-CM

## 2023-10-27 DIAGNOSIS — Z3A.00 WEEKS OF GESTATION OF PREGNANCY NOT SPECIFIED: ICD-10-CM

## 2023-10-27 DIAGNOSIS — Z88.1 ALLERGY STATUS TO OTHER ANTIBIOTIC AGENTS STATUS: ICD-10-CM

## 2023-11-22 ENCOUNTER — EMERGENCY (EMERGENCY)
Facility: HOSPITAL | Age: 23
LOS: 1 days | Discharge: HOME | End: 2023-11-22
Attending: EMERGENCY MEDICINE | Admitting: EMERGENCY MEDICINE
Payer: COMMERCIAL

## 2023-11-22 VITALS
HEART RATE: 81 BPM | OXYGEN SATURATION: 100 % | SYSTOLIC BLOOD PRESSURE: 132 MMHG | RESPIRATION RATE: 16 BRPM | DIASTOLIC BLOOD PRESSURE: 82 MMHG | TEMPERATURE: 98 F

## 2023-11-22 VITALS
WEIGHT: 162.92 LBS | RESPIRATION RATE: 18 BRPM | DIASTOLIC BLOOD PRESSURE: 92 MMHG | OXYGEN SATURATION: 98 % | HEIGHT: 64 IN | SYSTOLIC BLOOD PRESSURE: 126 MMHG | TEMPERATURE: 100 F | HEART RATE: 102 BPM

## 2023-11-22 DIAGNOSIS — O00.202 LEFT OVARIAN PREGNANCY WITHOUT INTRAUTERINE PREGNANCY: ICD-10-CM

## 2023-11-22 DIAGNOSIS — Z88.1 ALLERGY STATUS TO OTHER ANTIBIOTIC AGENTS STATUS: ICD-10-CM

## 2023-11-22 DIAGNOSIS — O00.90 UNSPECIFIED ECTOPIC PREGNANCY WITHOUT INTRAUTERINE PREGNANCY: ICD-10-CM

## 2023-11-22 LAB
HCG SERPL-ACNC: 143 MIU/ML — HIGH
HCG SERPL-ACNC: 143 MIU/ML — HIGH

## 2023-11-22 PROCEDURE — 76817 TRANSVAGINAL US OBSTETRIC: CPT | Mod: 26

## 2023-11-22 PROCEDURE — 99284 EMERGENCY DEPT VISIT MOD MDM: CPT | Mod: 25

## 2023-11-22 PROCEDURE — 36415 COLL VENOUS BLD VENIPUNCTURE: CPT

## 2023-11-22 PROCEDURE — 96372 THER/PROPH/DIAG INJ SC/IM: CPT

## 2023-11-22 PROCEDURE — 76801 OB US < 14 WKS SINGLE FETUS: CPT

## 2023-11-22 PROCEDURE — 84702 CHORIONIC GONADOTROPIN TEST: CPT

## 2023-11-22 PROCEDURE — 76801 OB US < 14 WKS SINGLE FETUS: CPT | Mod: 26

## 2023-11-22 PROCEDURE — 85025 COMPLETE CBC W/AUTO DIFF WBC: CPT

## 2023-11-22 PROCEDURE — 99285 EMERGENCY DEPT VISIT HI MDM: CPT

## 2023-11-22 PROCEDURE — 80053 COMPREHEN METABOLIC PANEL: CPT

## 2023-11-22 PROCEDURE — 76817 TRANSVAGINAL US OBSTETRIC: CPT

## 2023-11-22 RX ORDER — METHOTREXATE 2.5 MG/1
90 TABLET ORAL ONCE
Refills: 0 | Status: COMPLETED | OUTPATIENT
Start: 2023-11-22 | End: 2023-11-22

## 2023-11-22 RX ADMIN — METHOTREXATE 90 MILLIGRAM(S): 2.5 TABLET ORAL at 22:22

## 2023-11-22 NOTE — ED PROVIDER NOTE - PATIENT PORTAL LINK FT
You can access the FollowMyHealth Patient Portal offered by [facility] by registering at the following website: [email or web address]. By joining [employer]’s FollowMyHealth portal, you will also be able to view your health information using other applications (apps) compatible with our system.

## 2023-11-22 NOTE — CONSULT NOTE ADULT - SUBJECTIVE AND OBJECTIVE BOX
24 yo  at 9w3d by LMP presenting from outside clinic (Dr Hansen) for uptrending bHCG in the setting of previously expectantly managed L adnexal ectopic.     Pt initially presented to [facility] ED on 10/17 with vaginal spotting/cramping and was diagnosed with PUL with bhcg of 764. Patient re-presented 48 hrs later with new evidence of 1.5cm L adnexal ectopic pregnancy and bHCG of 404. Given downtrending bhCG decision was made to manage expectantly. Pt has been followed outpatient since that time with persistently downtrending bhCG (has seen Dr. Cantrell q week). Today, bhCG was noted to be higher than prior (113 on 10/13, today was 143) so she was sent to ED for evaluation.    She has 0/10 pain today but reports intermittent episodes of cramping/back pain since initially diagnosed with ectopic. She has been having persistent bleeding since that time as well, using 2 pads/day. Denies passage of clots, denies lightheadedness/dizziness.      OB/GYN Hx:      PMHx:  denies  SHx: L knee surgery w/o metal in 8th grade  Meds: denies  Allergies: flagyl (angioedema)    PHYSICAL EXAM:   Vital Signs Last 24 Hrs  T(C): 37.6 (2023 15:59), Max: 37.6 (2023 15:59)  T(F): 99.6 (2023 15:59), Max: 99.6 (2023 15:59)  HR: 102 (2023 15:59) (102 - 102)  BP: 126/92 (2023 15:59) (126/92 - 126/92)  BP(mean): --  RR: 18 (2023 15:59) (18 - 18)  SpO2: 98% (2023 15:59) (98% - 98%)    Parameters below as of 2023 15:59  Patient On (Oxygen Delivery Method): room air      Gen: Alert, NAD  Abd: Soft, notnender  SSE: ~3 cc of bright red blood in vault, no clots no active bleeding  Pelvic: Nontender, no masses appreciated    LABS:                        14.1   10.10 )-----------( 309      ( 2023 16:35 )             42.5         138  |  102  |  14  ----------------------------<  89  4.2   |  27  |  1.04    Ca    9.6      2023 16:35    TPro  6.9  /  Alb  4.5  /  TBili  0.4  /  DBili  x   /  AST  15  /  ALT  11  /  AlkPhos  51        Urinalysis Basic - ( 2023 16:35 )    Color: x / Appearance: x / SG: x / pH: x  Gluc: 89 mg/dL / Ketone: x  / Bili: x / Urobili: x   Blood: x / Protein: x / Nitrite: x   Leuk Esterase: x / RBC: x / WBC x   Sq Epi: x / Non Sq Epi: x / Bacteria: x      HCG Quantitative, Serum: 143 mIU/mL ( @ 16:35)      RADIOLOGY & ADDITIONAL STUDIES:  INTERPRETATION:  CLINICAL INFORMATION: Early pregnancy. Left pelvic pain   and abnormal bleeding    LMP: 10/9/2023 Estimated Gestational Age by LMP:    COMPARISON: Pelvic ultrasound 10/19/2023    Endovaginal and transabdominal pelvic sonogram. Color and Spectral   Doppler was performed.    FINDINGS:  Uterus: Approximately measures 6.3 x 3.6 x 4.1 cm. Mildly heterogeneous   in echogenicity.  Indistinct endometrial myometrial junction. Endometrium maximally   measures 8 mm.  No visualized intrauterine gestation. Trace volume fluid within the   intrauterine canal    Right ovary: 4.6 x 2.9 x 3.3 cm. Increased size is attributable to a 3 cm   likely dominant follicle Normal arterial and venous waveforms.  Left ovary: 2.6 x 1.3 x 2.5 cm. Within normal limits.    Left adnexal 1.2 x 1.4 x 1.3 cm heterogeneous echogenic mass is highly   suspicious for a left tubal ectopic pregnancy    Fluid: None.    IMPRESSION:    Findings highly suspicious for a left tubal ectopic pregnancy. Recommend   OB/GYN consultation 24 yo  at 9w3d by LMP  presenting from outside clinic (Dr Hansen) for uptrending bHCG in the setting of previously expectantly managed L adnexal ectopic.     Pt initially presented to [facility] ED on 10/17 with vaginal spotting/cramping and was diagnosed with PUL with bhcg of 764. Patient re-presented 48 hrs later with new evidence of 1.5cm L adnexal ectopic pregnancy and bHCG of 404. Given downtrending bhCG decision was made to manage expectantly. Pt has been followed outpatient since that time with persistently downtrending bhCG (has seen Dr. Cantrell q week). Today, bhCG was noted to be higher than prior (113 on 10/13, today was 143) so she was sent to ED for evaluation.    She has 0/10 pain today but reports intermittent episodes of cramping/back pain since initially diagnosed with ectopic. She has been having persistent bleeding since that time as well, using 2 pads/day. Denies passage of clots, denies lightheadedness/dizziness.    OB/GYN Hx:      PMHx:  denies  SHx: L knee surgery w/o metal in 8th grade  Meds: denies  Allergies: flagyl (angioedema)    PHYSICAL EXAM:   Vital Signs Last 24 Hrs  T(C): 37.6 (2023 15:59), Max: 37.6 (2023 15:59)  T(F): 99.6 (2023 15:59), Max: 99.6 (2023 15:59)  HR: 102 (2023 15:59) (102 - 102)  BP: 126/92 (2023 15:59) (126/92 - 126/92)  BP(mean): --  RR: 18 (2023 15:59) (18 - 18)  SpO2: 98% (2023 15:59) (98% - 98%)    Parameters below as of 2023 15:59  Patient On (Oxygen Delivery Method): room air      Gen: Alert, NAD  Abd: Soft, nontender. No rebound/guarding.   SSE: ~3 cc of bright red blood in vault, no clots no active bleeding. No lesions, no masses. No active bleeding with valsalva.   Pelvic: Nontender, no masses appreciated. Normal external genitalia.    LABS:                        14.1   10.10 )-----------( 309      ( 2023 16:35 )             42.5         138  |  102  |  14  ----------------------------<  89  4.2   |  27  |  1.04    Ca    9.6      2023 16:35    TPro  6.9  /  Alb  4.5  /  TBili  0.4  /  DBili  x   /  AST  15  /  ALT  11  /  AlkPhos  51        Urinalysis Basic - ( 2023 16:35 )    Color: x / Appearance: x / SG: x / pH: x  Gluc: 89 mg/dL / Ketone: x  / Bili: x / Urobili: x   Blood: x / Protein: x / Nitrite: x   Leuk Esterase: x / RBC: x / WBC x   Sq Epi: x / Non Sq Epi: x / Bacteria: x      HCG Quantitative, Serum: 143 mIU/mL ( @ 16:35)      RADIOLOGY & ADDITIONAL STUDIES:  INTERPRETATION:  CLINICAL INFORMATION: Early pregnancy. Left pelvic pain   and abnormal bleeding    LMP: 10/9/2023 Estimated Gestational Age by LMP:    COMPARISON: Pelvic ultrasound 10/19/2023    Endovaginal and transabdominal pelvic sonogram. Color and Spectral   Doppler was performed.    FINDINGS:  Uterus: Approximately measures 6.3 x 3.6 x 4.1 cm. Mildly heterogeneous   in echogenicity.  Indistinct endometrial myometrial junction. Endometrium maximally   measures 8 mm.  No visualized intrauterine gestation. Trace volume fluid within the   intrauterine canal    Right ovary: 4.6 x 2.9 x 3.3 cm. Increased size is attributable to a 3 cm   likely dominant follicle Normal arterial and venous waveforms.  Left ovary: 2.6 x 1.3 x 2.5 cm. Within normal limits.    Left adnexal 1.2 x 1.4 x 1.3 cm heterogeneous echogenic mass is highly   suspicious for a left tubal ectopic pregnancy    Fluid: None.    IMPRESSION:    Findings highly suspicious for a left tubal ectopic pregnancy. Recommend   OB/GYN consultation

## 2023-11-22 NOTE — CONSULT NOTE ADULT - ASSESSMENT
22 yo  at 9w3d by LMP presenting with uptrending bHCG in the setting of previously expectantly managed L adnexal ectopic. Today, size of L adnexal ectopic is stable ~1.5cm, and patient has no pain or acute changes in bleeding. Pt is hemodynamically stable with normal Hb.    Given uptrending bHCG and persitent ectopic, plan for IM Methotrexate 90mg.    Pt counseled on medical vs surgical treatment options for ectopic pregnancy. Risks of MTX including but not limited to GI complaints, possible rupture of ectopic, possible need for re-dose, and possible need for surgery discussed. Risk of surgery including infection, bleeding, damage to adjacent organs, scarring with salpingostomy, and possible removal of fallopian tube. Patient is a candidate for Methotrexate tx. Pt does not have contraindications to MTX including blood dyscrasia, active pulmonary disease, hepatic /renal disease, or hematologic dysfunstion. Pt was counseled on avoiding folic acid containing foods, prenatal vitamins, alcohol, breastfeeding, as well as intercourse. Pt understands she cannot attempt to conceive for at least 3 months. Patient expressed understanding of full discussion on MTX and had all questions answered.   Methotrexate  90   mg IM administered once into ***  buttock. Pt to follow up on      for repeat bHCG chec k on .    - GYN noted that pt has mild increase in Creatinine from prior, still within normal range. Please consider bolus for IV hydration.    [place] PGY2 discussed with Sky PGY4 and Dr. Mcpherson   22 yo  at 9w3d by LMP presenting with uptrending bHCG in the setting of previously expectantly managed L adnexal ectopic. Today, size of L adnexal ectopic is stable ~1.5cm, and patient has no pain or acute changes in bleeding. Pt is hemodynamically stable with normal Hb.    Given uptrending bHCG and persistent ectopic, plan for IM Methotrexate 90mg.    Pt counseled on medical vs surgical treatment options for ectopic pregnancy. Risks of MTX including but not limited to GI complaints, possible rupture of ectopic, possible need for re-dose, and possible need for surgery discussed. Risk of surgery including infection, bleeding, damage to adjacent organs, scarring with salpingostomy, and possible removal of fallopian tube. Patient is a candidate for Methotrexate tx. Pt does not have contraindications to MTX including blood dyscrasia, active pulmonary disease, hepatic /renal disease, or hematologic dysfunstion. Pt was counseled on avoiding folic acid containing foods, prenatal vitamins, alcohol, breastfeeding, as well as intercourse. Pt understands she cannot attempt to conceive for at least 3 months. Patient expressed understanding of full discussion on MTX and had all questions answered.   Methotrexate  90   mg IM administered once into R  buttock. Pt to follow up on  for repeat bHCG chec k on .    - GYN noted that pt has mild increase in Creatinine from prior, still within normal range. Please consider bolus for IV hydration.    [place] PGY2 discussed with Sky PGY4 and Dr. Mcpherson

## 2023-11-22 NOTE — ED PROVIDER NOTE - NSFOLLOWUPINSTRUCTIONS_ED_ALL_ED_FT
Please return to the ER on Saturday for repeat HCG test/ evaluation. Return to the ER if symptoms worsen or other concerns.     Methotrexate Treatment for an Ectopic Pregnancy, Care After  A person having a blood sample taken from the arm.  After the methotrexate treatment, it is common to have cramping in your belly. It is also common to have:  Vaginal bleeding.  Tiredness.  Nausea.  Vomiting.  Diarrhea.  Blood tests will be taken at certain times for days or a week. This is to check your pregnancy hormone levels. This will continue until the hormone can no longer be found in the blood.    The treatment may not work. Surgery may be done to remove the pregnancy.    Follow these instructions at home:  The instructions below may help you care for yourself at home. Your health care provider may give you more instructions. If you have questions, ask your health care provider.    Activity    Do not have sex, douche, or put anything, such as tampons, in your vagina. Wait until your health care provider says it is okay.  Limit activities that take a lot of effort as told by your health care provider.  Medicines    Take over the counter and prescription medicines only as told by your health care provider.  Do not take aspirin, ibuprofen, or other NSAIDs.  Do not take folic acid, prenatal vitamins, or other vitamins that have folic acid.  General instructions    A sign showing that a person should not drink alcohol.  Follow instructions from your health care provider about what you may eat and drink.  Do not drink alcohol.  Avoid foods that produce a lot of gas. This can hide signs of a ruptured ectopic pregnancy.  Methotrexate can make you more sensitive to the sun. Limit time in the sun and artificial UV light, like a tanning bed.  Report symptoms that may indicate a rupture as told by your health care provider.  Contact a health care provider if:  You have persistent nausea and vomiting.  You have persistent diarrhea.  You have a reaction to the medicine, such as:  Tiredness.  Skin rash.  Hair loss.  You develop a cough.  You have chills or a fever.  Get help right away if:  Pain in your belly or in the area between your hip bones (pelvic area) gets worse.  You have more vaginal bleeding.  You feel light-headed or you faint.  You have shortness of breath.  Your heart rate increases.  These symptoms may be an emergency. Get help right away. Call 911.  Do not wait to see if the symptoms will go away.  Do not drive yourself to the hospital.  Summary  After the treatment, it is common to have cramping in your belly. You may also have vaginal bleeding and tiredness.  Blood tests will be taken at certain times for days or a week. This is to check your pregnancy hormone levels. This will continue until the hormone can no longer be found in the blood.  Limit activities that take a lot of effort as told by your health care provider.  Report symptoms that may indicate a rupture as told by your health care provider.  This information is not intended to replace advice given to you by your health care provider. Make sure you discuss any questions you have with your health care provider.

## 2023-11-22 NOTE — ED ADULT NURSE NOTE - OBJECTIVE STATEMENT
23 year old F patient, , with c/o of elevating HCG results.  Confirmed ectopic a few weeks ago as per patient, unknown last LMP but c/o of todays results being higher and it was going down prior.  Did not receive methotraxate.  No distress noted.  C/o of mild vaginal bleeding.  Sent in from gyn office.

## 2023-11-22 NOTE — ED PROVIDER NOTE - OBJECTIVE STATEMENT
at 5w6d w/ (questionable) LMP around 9/10, here to r/o ectopic. Has been following hcg with obgyn in office. Was initially downtrending from 764 on 10/17 to 95 on 10/25, but has since been trending up again, most recently 260 on . Has some intermittent discomfort llq and bleeding. No current pain. Denies fever/chills, other symptoms.

## 2023-11-22 NOTE — ED PROVIDER NOTE - CLINICAL SUMMARY MEDICAL DECISION MAKING FREE TEXT BOX
sent by gyn for uptrending hcg with pregnancy unknown location. labs/us ordered. rh+ from prior visit. us done with concern for left tubal ectopic. no signs rupture. gyn consulted. gave methotrexate. will return saturday for repeat hcg. return precautions discussed

## 2023-11-22 NOTE — ED ADULT TRIAGE NOTE - CHIEF COMPLAINT QUOTE
Pt  6wks told she has an ectopic pregnancy. Hcg had been trending down, now going back up. Alsoc o LLQ abdominal pain.

## 2023-11-22 NOTE — ED ADULT TRIAGE NOTE - PAIN: PRESENCE, MLM
Bedside and Verbal shift change report given to Aparna Kincaid LPN (oncoming nurse) by Geovanny Jarvis RN And Darshan Villalba RN (offgoing nurse). Report included the following information SBAR, Kardex, Intake/Output and Dual Neuro Assessment. complains of pain/discomfort

## 2023-11-22 NOTE — ED ADULT NURSE NOTE - NSFALLUNIVINTERV_ED_ALL_ED
Bed/Stretcher in lowest position, wheels locked, appropriate side rails in place/Call bell, personal items and telephone in reach/Instruct patient to call for assistance before getting out of bed/chair/stretcher/Non-slip footwear applied when patient is off stretcher/[place] to call system/Physically safe environment - no spills, clutter or unnecessary equipment/Purposeful proactive rounding/Room/bathroom lighting operational, light cord in reach

## 2023-11-25 ENCOUNTER — EMERGENCY (EMERGENCY)
Facility: HOSPITAL | Age: 23
LOS: 1 days | Discharge: HOME | End: 2023-11-25
Admitting: STUDENT IN AN ORGANIZED HEALTH CARE EDUCATION/TRAINING PROGRAM
Payer: COMMERCIAL

## 2023-11-25 VITALS
WEIGHT: 162.92 LBS | RESPIRATION RATE: 17 BRPM | HEART RATE: 87 BPM | TEMPERATURE: 98 F | OXYGEN SATURATION: 99 % | HEIGHT: 64 IN | SYSTOLIC BLOOD PRESSURE: 137 MMHG | DIASTOLIC BLOOD PRESSURE: 82 MMHG

## 2023-11-25 LAB
BASOPHILS # BLD AUTO: 0.03 K/UL — SIGNIFICANT CHANGE UP (ref 0–0.2)
BASOPHILS # BLD AUTO: 0.03 K/UL — SIGNIFICANT CHANGE UP (ref 0–0.2)
BASOPHILS NFR BLD AUTO: 0.5 % — SIGNIFICANT CHANGE UP (ref 0–2)
BASOPHILS NFR BLD AUTO: 0.5 % — SIGNIFICANT CHANGE UP (ref 0–2)
EOSINOPHIL # BLD AUTO: 0.13 K/UL — SIGNIFICANT CHANGE UP (ref 0–0.5)
EOSINOPHIL # BLD AUTO: 0.13 K/UL — SIGNIFICANT CHANGE UP (ref 0–0.5)
EOSINOPHIL NFR BLD AUTO: 2.1 % — SIGNIFICANT CHANGE UP (ref 0–6)
EOSINOPHIL NFR BLD AUTO: 2.1 % — SIGNIFICANT CHANGE UP (ref 0–6)
HCG SERPL-ACNC: 101 MIU/ML — HIGH
HCG SERPL-ACNC: 101 MIU/ML — HIGH
HCT VFR BLD CALC: 40.8 % — SIGNIFICANT CHANGE UP (ref 34.5–45)
HCT VFR BLD CALC: 40.8 % — SIGNIFICANT CHANGE UP (ref 34.5–45)
HGB BLD-MCNC: 13.6 G/DL — SIGNIFICANT CHANGE UP (ref 11.5–15.5)
HGB BLD-MCNC: 13.6 G/DL — SIGNIFICANT CHANGE UP (ref 11.5–15.5)
IMM GRANULOCYTES NFR BLD AUTO: 0.2 % — SIGNIFICANT CHANGE UP (ref 0–0.9)
IMM GRANULOCYTES NFR BLD AUTO: 0.2 % — SIGNIFICANT CHANGE UP (ref 0–0.9)
LYMPHOCYTES # BLD AUTO: 1.76 K/UL — SIGNIFICANT CHANGE UP (ref 1–3.3)
LYMPHOCYTES # BLD AUTO: 1.76 K/UL — SIGNIFICANT CHANGE UP (ref 1–3.3)
LYMPHOCYTES # BLD AUTO: 28.9 % — SIGNIFICANT CHANGE UP (ref 13–44)
LYMPHOCYTES # BLD AUTO: 28.9 % — SIGNIFICANT CHANGE UP (ref 13–44)
MCHC RBC-ENTMCNC: 29.9 PG — SIGNIFICANT CHANGE UP (ref 27–34)
MCHC RBC-ENTMCNC: 29.9 PG — SIGNIFICANT CHANGE UP (ref 27–34)
MCHC RBC-ENTMCNC: 33.3 GM/DL — SIGNIFICANT CHANGE UP (ref 32–36)
MCHC RBC-ENTMCNC: 33.3 GM/DL — SIGNIFICANT CHANGE UP (ref 32–36)
MCV RBC AUTO: 89.7 FL — SIGNIFICANT CHANGE UP (ref 80–100)
MCV RBC AUTO: 89.7 FL — SIGNIFICANT CHANGE UP (ref 80–100)
MONOCYTES # BLD AUTO: 0.36 K/UL — SIGNIFICANT CHANGE UP (ref 0–0.9)
MONOCYTES # BLD AUTO: 0.36 K/UL — SIGNIFICANT CHANGE UP (ref 0–0.9)
MONOCYTES NFR BLD AUTO: 5.9 % — SIGNIFICANT CHANGE UP (ref 2–14)
MONOCYTES NFR BLD AUTO: 5.9 % — SIGNIFICANT CHANGE UP (ref 2–14)
NEUTROPHILS # BLD AUTO: 3.81 K/UL — SIGNIFICANT CHANGE UP (ref 1.8–7.4)
NEUTROPHILS # BLD AUTO: 3.81 K/UL — SIGNIFICANT CHANGE UP (ref 1.8–7.4)
NEUTROPHILS NFR BLD AUTO: 62.4 % — SIGNIFICANT CHANGE UP (ref 43–77)
NEUTROPHILS NFR BLD AUTO: 62.4 % — SIGNIFICANT CHANGE UP (ref 43–77)
NRBC # BLD: 0 /100 WBCS — SIGNIFICANT CHANGE UP (ref 0–0)
NRBC # BLD: 0 /100 WBCS — SIGNIFICANT CHANGE UP (ref 0–0)
PLATELET # BLD AUTO: 286 K/UL — SIGNIFICANT CHANGE UP (ref 150–400)
PLATELET # BLD AUTO: 286 K/UL — SIGNIFICANT CHANGE UP (ref 150–400)
RBC # BLD: 4.55 M/UL — SIGNIFICANT CHANGE UP (ref 3.8–5.2)
RBC # BLD: 4.55 M/UL — SIGNIFICANT CHANGE UP (ref 3.8–5.2)
RBC # FLD: 12.9 % — SIGNIFICANT CHANGE UP (ref 10.3–14.5)
RBC # FLD: 12.9 % — SIGNIFICANT CHANGE UP (ref 10.3–14.5)
WBC # BLD: 6.1 K/UL — SIGNIFICANT CHANGE UP (ref 3.8–10.5)
WBC # BLD: 6.1 K/UL — SIGNIFICANT CHANGE UP (ref 3.8–10.5)
WBC # FLD AUTO: 6.1 K/UL — SIGNIFICANT CHANGE UP (ref 3.8–10.5)
WBC # FLD AUTO: 6.1 K/UL — SIGNIFICANT CHANGE UP (ref 3.8–10.5)

## 2023-11-25 PROCEDURE — 85025 COMPLETE CBC W/AUTO DIFF WBC: CPT

## 2023-11-25 PROCEDURE — 99284 EMERGENCY DEPT VISIT MOD MDM: CPT

## 2023-11-25 PROCEDURE — 36415 COLL VENOUS BLD VENIPUNCTURE: CPT

## 2023-11-25 PROCEDURE — 84702 CHORIONIC GONADOTROPIN TEST: CPT

## 2023-11-25 NOTE — ED ADULT TRIAGE NOTE - CHIEF COMPLAINT QUOTE
Pt sent for f/u for ectopic pregnancy, received methotrexate wednesday. LMP 10/19, reports vaginal bleeding currently. Denies abdominal pain, n/v, cp, sob.

## 2023-11-25 NOTE — ED PROVIDER NOTE - PATIENT PORTAL LINK FT
You can access the FollowMyHealth Patient Portal offered by [facility] by registering at the following website: [email or web address]. By joining [employer]’s FollowMyHealth portal, you will also be able to view your health information using other applications (apps) compatible with our system.

## 2023-11-25 NOTE — ED PROVIDER NOTE - CLINICAL SUMMARY MEDICAL DECISION MAKING FREE TEXT BOX
pt returns for beta check (143 on 11/22) has a known ectopic, was tx'd w/mtx --- downtrending beta, pt feeling well, abd soft/nt, seen by gyn and cleared for dc, to return on tues for another check, strict return precautions given

## 2023-11-25 NOTE — CONSULT NOTE ADULT - SUBJECTIVE AND OBJECTIVE BOX
22 yo  at 9w3d by LMP s/p MTX 90mg x1 for suspected L adnexal ectopic found on TVUS on  presenting for repeat beta hcg. She has 0/10 pain today but reports intermittent cramping pain. Pt states she still has vaginal spotting but does not endorse any heavy bleeding.  Denies passage of clots, denies lightheadedness/dizziness or other symptoms of anemia.    Course:  Pt initially presented to [facility] ED on 10/17 with vaginal spotting/cramping and was diagnosed with PUL with bhcg of 764. Patient re-presented 48 hrs later with new evidence of 1.5cm L adnexal ectopic pregnancy and bHCG of 404. Given downtrending bhCG decision was made to manage expectantly. Pt has been followed outpatient since that time with persistently downtrending bhCG (has seen Dr. Cantrell q week). On , bhCG was noted to be higher than prior (113 on 10/13, on  was 143) so she was sent to ED for evaluation. Pt was found on TVUS to have 1.2x1.4x1.3cm echogenic mass highly suspicious for L tubal ectopic. PT was provided 90mg MTX and told to f/u on  for Day 4 beta hcg.      OB/GYN Hx:    PMHx:  denies  SHx: L knee surgery w/o metal in 8th grade  Meds: denies  Allergies: flagyl (angioedema)Pt denies fever, chills, chest pain, SOB, abdominal pain, nausea, vomiting, vaginal bleeding    PHYSICAL EXAM:   Vital Signs Last 24 Hrs  T(C): 36.6 (2023 10:16), Max: 36.6 (2023 10:16)  T(F): 97.8 (2023 10:16), Max: 97.8 (2023 10:16)  HR: 87 (2023 10:16) (87 - 87)  BP: 137/82 (2023 10:16) (137/82 - 137/82)  BP(mean): --  RR: 17 (2023 10:16) (17 - 17)  SpO2: 99% (2023 10:16) (99% - 99%)    Parameters below as of 2023 10:16  Patient On (Oxygen Delivery Method): room air        **************************  Constitutional: Alert & Oriented x3, No acute distress  Respiratory: Clear to ausculation bilaterally; no wheezing, rhonchi, or crackles  Cardiovascular: regular rate and rhythm, no murmurs, or gallops  Gastrointestinal: soft, non tender, positive bowel sounds, no rebound or guarding   Extremities: no calf tenderness or swelling      LABS:                        13.6   6.10  )-----------( 286      ( 2023 10:34 )             40.8               HCG Quantitative, Serum: 101 mIU/mL ( @ 10:34)      RADIOLOGY & ADDITIONAL STUDIES:

## 2023-11-25 NOTE — ED PROVIDER NOTE - OBJECTIVE STATEMENT
The pt is a 22 y/o F, who returns to ED for beta check - found to have an ectopic, was tx'd w/MTX. Reports some mild cramping, vag spotting - using 1-2 pads/day. Denies fevers, chills, cp, sob, n/v/d.

## 2023-11-25 NOTE — CONSULT NOTE ADULT - ASSESSMENT
24 yo  at 9w3d by LMP s/p MTX 90mg x1 on  for suspected L adnexal ectopic found on TVUS on  presenting for (day 4) repeat beta hcg.   - Vitals signs normal pt is clinically and hemodynamically stable  - Hgb normal at 13.6  - Beta hcg downtrended from 143 on  to 101 on  (day 4 beta)  - Given pt is clinically well appearing and has no concerning signs or symptoms of rupture or anemia, pt stable and able to f/u for repeat beta hcg check on "day 7" on .  - Pt counseled on strict return precautions    D/w: Dr. Brown and Dr. Almonte

## 2023-11-25 NOTE — ED ADULT NURSE NOTE - OBJECTIVE STATEMENT
Pt arrives for follow up hcg check s/p methotrexate on Wednesday for suspected ectopic pregnancy. Pt denies passage of clots or copious bleeding. States vaginal bleeding persists from Wednesday but has improved/lessoned in quantity. Denies discharge, fevers, chills, pelvic pain, abdominal, flank pain, or dysuria. Alert, oriented, and ambulatory at time of assessment.

## 2023-11-25 NOTE — ED PROVIDER NOTE - NSFOLLOWUPINSTRUCTIONS_ED_ALL_ED_FT
please return on tuesday (11/28) for beta check  keep yourself well hydrated, take tylenol if needed  return immediately for any worsening or concerning symptoms

## 2023-11-25 NOTE — ED PROVIDER NOTE - HISTORY ATTESTATION, MLM
well developed, well nourished , in no acute distress , ambulating without difficulty , normal communication ability I have reviewed and confirmed nurses' notes...

## 2023-11-25 NOTE — ED ADULT NURSE NOTE - SUICIDE SCREENING QUESTION 1
Patient Education     Well-Child Checkup: 6 to 10 Years     Struggles in school can indicate problems with a child’s health or development. If your child is having trouble in school, talk to the child’s healthcare provider.   Even if your child is healthy, keep bringing him or her in for yearly checkups. These visits make sure that your child’s health is protected with scheduled vaccines and health screenings. Your child's healthcare provider will also check his or her growth and development. This sheet describes some of what you can expect.  School and social issues  Here are some topics you, your child, and the healthcare provider may want to discuss during this visit:  · Reading. Does your child like to read? Is the child reading at the right level for his or her age group?   · Friendships. Does your child have friends at school? How do they get along? Do you like your child’s friends? Do you have any concerns about your child’s friendships or problems that may be happening with other children, such as bullying?  · Activities. What does your child like to do for fun? Is he or she involved in after-school activities such as sports, scouting, or music classes?   · Family interaction. How are things at home? Does your child have good relationships with others in the family? Does he or she talk to you about problems? How is the child’s behavior at home?   · Behavior and participation at school. How does your child act at school? Does the child follow the classroom routine and take part in group activities? What do teachers say about the child’s behavior? Is homework finished on time? Do you or other family members help with homework?  · Household chores. Does your child help around the house with chores such as taking out the trash or setting the table?  Nutrition and exercise tips  Teaching your child healthy eating and lifestyle habits can lead to a lifetime of good health. To help, set a good example with your  words and actions. Remember, good habits formed now will stay with your child forever. Here are some tips:  · Help your child get at least 30 to 60 minutes of active play per day. Moving around helps keep your child healthy. Go to the park, ride bikes, or play active games like tag or ball.  · Limit “screen time” to 1 hour each day. This includes time spent watching TV, playing video games, using the computer, and texting. If your child has a TV, computer, or video game console in the bedroom, replace it with a music player. For many kids, dancing and singing are fun ways to get moving.  · Limit sugary drinks. Soda, juice, and sports drinks lead to unhealthy weight gain and tooth decay. Water and low-fat or nonfat milk are best to drink. In moderation (6 ounces for a child 6 years old and 12 ounces for a child 7 to 10 years old daily), 100% fruit juice is OK. Save soda and other sugary drinks for special occasions.   · Serve nutritious foods. Keep a variety of healthy foods on hand for snacks, including fresh fruits and vegetables, lean meats, and whole grains. Foods like french fries, candy, and snack foods should only be served rarely.   · Serve child-sized portions. Children don’t need as much food as adults. Serve your child portions that make sense for his or her age and size. Let your child stop eating when he or she is full. If your child is still hungry after a meal, offer more vegetables or fruit.  · Ask the healthcare provider about your child’s weight. Your child should gain about 4 to 5 pounds (1.81 to 2.27 kg) each year. If your child is gaining more than that, talk to the healthcare provider about healthy eating habits and exercise guidelines.  · Bring your child to the dentist at least twice a year for teeth cleaning and a checkup.  Sleeping tips  Now that your child is in school, a good night’s sleep is even more important. At this age, your child needs about 10 hours of sleep each night. Here are  some tips:  · Set a bedtime and make sure your child follows it each night.  · TV, computer, and video games can agitate a child and make it hard to calm down for the night. Turn them off at least an hour before bed. Instead, read a chapter of a book together.  · Remind your child to brush and floss his or her teeth before bed. Directly supervise your child's dental self-care to make sure that both the back teeth and the front teeth are cleaned.  Safety tips  Recommendations to keep your child safe include the following:   · When riding a bike, your child should wear a helmet with the strap fastened. While roller-skating, roller-blading, or using a scooter or skateboard, it’s safest to wear wrist guards, elbow pads, knee pads, and a helmet.  · In the car, continue to use a booster seat until your child is taller than 4 feet 9 inches. At this height, kids are able to sit with the seat belt fitting correctly over the collarbone and hips. Ask the healthcare provider if you have questions about when your child will be ready to stop using a booster seat. All children younger than 13 should sit in the back seat.  · Teach your child not to talk to strangers or go anywhere with a stranger.  · Teach your child to swim. Many communities offer low-cost swimming lessons. Do not let your child play in or around a pool unattended, even if he or she knows how to swim.  Vaccines  Based on recommendations from the CDC, at this visit your child may receive the following vaccines:  · Diphtheria, tetanus, and pertussis (age 6 only)  · Human papillomavirus (HPV) (ages 9 and up)  · Influenza (flu), annually  · Measles, mumps, and rubella (age 6)  · Polio (age 6)  · Varicella (chickenpox) (age 6)  Bedwetting: It’s not your child’s fault  Bedwetting, or urinating when sleeping, can be frustrating for both you and your child. But it’s usually not a sign of a major problem. Your child’s body may simply need more time to mature. If a child  suddenly starts wetting the bed, the cause is often a lifestyle change (such as starting school) or a stressful event (such as the birth of a sibling). But whatever the cause, it’s not in your child’s direct control. If your child wets the bed:  · Keep in mind that your child is not wetting on purpose. Never punish or tease a child for wetting the bed. Punishment or shaming may make the problem worse, not better.  · To help your child, be positive and supportive. Praise your child for not wetting and even for trying hard to stay dry.  · Two hours before bedtime don’t serve your child anything to drink.  · Remind your child to use the toilet before bed. You could also wake him or her to use the bathroom before you go to bed yourself.  · Have a routine for changing sheets and pajamas when the child wets. Try to make this routine as calm and orderly as possible. This will help keep both you and your child from getting too upset or frustrated to go back to sleep.  · Put up a calendar or chart and give your child a star or sticker for nights that he or she doesn’t wet the bed.  · Encourage your child to get out of bed and try to use the toilet if he or she wakes during the night. Put night-lights in the bedroom, hallway, and bathroom to help your child feel safer walking to the bathroom.  · If you have concerns about bedwetting, discuss them with the healthcare provider.  [employer] last reviewed this educational content on 4/1/2020 © 2000-2020 [employer]. All rights reserved. This information is not intended as a substitute for professional medical care. Always follow your healthcare professional's instructions.            No

## 2023-11-27 DIAGNOSIS — Z3A.09 9 WEEKS GESTATION OF PREGNANCY: ICD-10-CM

## 2023-11-27 DIAGNOSIS — R10.9 UNSPECIFIED ABDOMINAL PAIN: ICD-10-CM

## 2023-11-27 DIAGNOSIS — O99.891 OTHER SPECIFIED DISEASES AND CONDITIONS COMPLICATING PREGNANCY: ICD-10-CM

## 2023-11-27 DIAGNOSIS — O26.851 SPOTTING COMPLICATING PREGNANCY, FIRST TRIMESTER: ICD-10-CM

## 2023-11-27 DIAGNOSIS — Z88.1 ALLERGY STATUS TO OTHER ANTIBIOTIC AGENTS STATUS: ICD-10-CM

## 2023-11-27 NOTE — CHART NOTE - NSCHARTNOTEFT_GEN_A_CORE
LM to remind patient to come in for day 7 [facility] tomorrow 11/28. Patient did not answer. LM to remind her to come in tomorrow.

## 2023-11-28 ENCOUNTER — EMERGENCY (EMERGENCY)
Facility: HOSPITAL | Age: 23
LOS: 1 days | Discharge: HOME | End: 2023-11-28
Admitting: STUDENT IN AN ORGANIZED HEALTH CARE EDUCATION/TRAINING PROGRAM
Payer: COMMERCIAL

## 2023-11-28 VITALS
HEART RATE: 69 BPM | TEMPERATURE: 99 F | HEIGHT: 64 IN | RESPIRATION RATE: 16 BRPM | DIASTOLIC BLOOD PRESSURE: 84 MMHG | SYSTOLIC BLOOD PRESSURE: 136 MMHG | OXYGEN SATURATION: 100 % | WEIGHT: 162.92 LBS

## 2023-11-28 LAB
HCG SERPL-ACNC: 71 MIU/ML — HIGH
HCG SERPL-ACNC: 71 MIU/ML — HIGH

## 2023-11-28 PROCEDURE — 84702 CHORIONIC GONADOTROPIN TEST: CPT

## 2023-11-28 PROCEDURE — 36415 COLL VENOUS BLD VENIPUNCTURE: CPT

## 2023-11-28 PROCEDURE — 99284 EMERGENCY DEPT VISIT MOD MDM: CPT

## 2023-11-28 NOTE — CONSULT NOTE ADULT - ASSESSMENT
22 yo  at 9w3d by LMP s/p MTX 90mg x1 on  for suspected L adnexal ectopic found on TVUS on  presenting for day 7 bHCG.   -VSS, afebrile, pt is hemodynamically stable   -bHCG downtrended from 101>71 today (30% downtrend, appropriate).   -Given patient is clinically and hemodynamically stable, no s/s concerning for ruptured ectopic, plan to repeat bHCG  outpatient with Dr. Hansen   -Pt counseled on strict return precautions    Marco Antonio Duarte PGY2  D/w Dr. Wilkins and Dr. Swanson PGY4   22 yo  at 9w3d by LMP s/p MTX 90mg x1 on  for suspected L adnexal ectopic found on TVUS on  presenting for day 7 bHCG.   -VSS, afebrile, pt is hemodynamically stable   -bHCG downtrended from 101>71 today (30% downtrend, appropriate).   -Given patient is clinically and hemodynamically stable, no s/s concerning for ruptured ectopic, plan to repeat bHCG  outpatient with Dr. Hansen   -Pt counseled on strict return precautions  -Rh+, no need for rhogam    Marco Antonio Duarte PGY2  D/w Dr. Wilkins and Dr. Swanson PGY4

## 2023-11-28 NOTE — CONSULT NOTE ADULT - SUBJECTIVE AND OBJECTIVE BOX
24 yo  at 9w3d by LMP s/p MTX 90mg x1 for suspected L adnexal ectopic found on TVUS on  presenting for repeat beta hcg. She has 0/10 pain today but reports intermittent cramping pain. Pt states she still has vaginal spotting but does not endorse any heavy bleeding.  Denies passage of clots, denies lightheadedness/dizziness or other symptoms of anemia.    Course:  Pt initially presented to [facility] ED on 10/17 with vaginal spotting/cramping and was diagnosed with PUL with bhcg of 764. Patient re-presented 48 hrs later with new evidence of 1.5cm L adnexal ectopic pregnancy and bHCG of 404. Given downtrending bhCG decision was made to manage expectantly. Pt has been followed outpatient since that time with persistently downtrending bhCG (has seen Dr. Cantrell q week). On , bhCG was noted to be higher than prior (113 on 10/13, on  was 143) so she was sent to ED for evaluation. Pt was found on TVUS to have 1.2x1.4x1.3cm echogenic mass highly suspicious for L tubal ectopic. PT was provided 90mg MTX. She came back for monitoring for day 4 bHCG, noted to be 101.     OB/GYN Hx:    PMHx:  denies  SHx: L knee surgery w/o metal in 8th grade  Meds: denies  Allergies: flagyl (angioedema). Pt denies fever, chills, chest pain, SOB, abdominal pain, nausea, vomiting, vaginal bleeding      Pt denies fever, chills, chest pain, SOB, abdominal pain, nausea, vomiting, vaginal bleeding      OB/GYN Hx:  Last PAP smear:     PMHx:   SHx:  Meds:   Allergies:     Social hx:     PHYSICAL EXAM:   Vital Signs Last 24 Hrs  T(C): 37.1 (2023 18:44), Max: 37.1 (2023 18:44)  T(F): 98.8 (2023 18:44), Max: 98.8 (2023 18:44)  HR: 69 (2023 18:44) (69 - 69)  BP: 136/84 (2023 18:44) (136/84 - 136/84)  BP(mean): --  RR: 16 (2023 18:44) (16 - 16)  SpO2: 100% (2023 18:44) (100% - 100%)    Parameters below as of 2023 18:44  Patient On (Oxygen Delivery Method): room air    **************************  Constitutional: NAD  Respiratory: breathing well on RA  Pelvic exam: deferred  Extremities: no calf tenderness or swelling    LABS:    HCG Quantitative, Serum: 71 mIU/mL ( @ 19:02)   24 yo  at 9w3d by LMP s/p MTX 90mg x1 for suspected L adnexal ectopic found on TVUS on  presenting for repeat beta hcg. She has 0/10 pain today but reports intermittent cramping pain. Pt states she still has vaginal spotting but does not endorse any heavy bleeding.  Denies passage of clots, denies lightheadedness/dizziness or other symptoms of anemia.    Course:  Pt initially presented to [facility] ED on 10/17 with vaginal spotting/cramping and was diagnosed with PUL with bhcg of 764. Patient re-presented 48 hrs later with new evidence of 1.5cm L adnexal ectopic pregnancy and bHCG of 404. Given downtrending bhCG decision was made to manage expectantly. Pt has been followed outpatient since that time with persistently downtrending bhCG (has seen Dr. Cantrell q week). On , bhCG was noted to be higher than prior (113 on 10/13, on  was 143) so she was sent to ED for evaluation. Pt was found on TVUS to have 1.2x1.4x1.3cm echogenic mass highly suspicious for L tubal ectopic. PT was provided 90mg MTX. She came back for monitoring for day 4 bHCG, noted to be 101.     OB/GYN Hx:    PMHx:  denies  SHx: L knee surgery w/o metal in 8th grade  Meds: denies  Allergies: flagyl (angioedema). Pt denies fever, chills, chest pain, SOB, abdominal pain, nausea, vomiting, vaginal bleeding    PHYSICAL EXAM:   Vital Signs Last 24 Hrs  T(C): 37.1 (2023 18:44), Max: 37.1 (2023 18:44)  T(F): 98.8 (2023 18:44), Max: 98.8 (2023 18:44)  HR: 69 (2023 18:44) (69 - 69)  BP: 136/84 (2023 18:44) (136/84 - 136/84)  BP(mean): --  RR: 16 (2023 18:44) (16 - 16)  SpO2: 100% (2023 18:44) (100% - 100%)    Parameters below as of 2023 18:44  Patient On (Oxygen Delivery Method): room air    **************************  Constitutional: NAD  Respiratory: breathing well on RA  Pelvic exam: deferred  Extremities: no calf tenderness or swelling    LABS:    HCG Quantitative, Serum: 71 mIU/mL ( @ 19:02)

## 2023-11-28 NOTE — ED ADULT NURSE NOTE - OBJECTIVE STATEMENT
24 y/o F presents to ED for beta check. Endorses cramping, vaginal spotting, denies chest pain, fever, chills, SOB, n/v/d.

## 2023-11-28 NOTE — ED PROVIDER NOTE - PATIENT PORTAL LINK FT
You can access the FollowMyHealth Patient Portal offered by [facility] by registering at the following website: [email or web address]. By joining [employer]’s FollowMyHealth portal, you will also be able to view your health information using other applications (apps) compatible with our system.

## 2023-11-28 NOTE — ED PROVIDER NOTE - OBJECTIVE STATEMENT
22 y/o f presents for 7 day hcg following mtx on 11/22 for ectopic pregnancy.  Pt stating she feels well, has no pain or vag bleeding.

## 2023-11-28 NOTE — ED ADULT TRIAGE NOTE - GLASGOW COMA SCALE: BEST VERBAL RESPONSE, MLM
(V5) oriented Protopic Pregnancy And Lactation Text: This medication is Pregnancy Category C. It is unknown if this medication is excreted in breast milk when applied topically.

## 2023-11-28 NOTE — ED ADULT TRIAGE NOTE - CHIEF COMPLAINT QUOTE
Pt aaox3 coming for rpt hcg check after receiving Methotrexate for an eptopic preg. Pt denies any medical complaints at this time.

## 2023-11-28 NOTE — ED PROVIDER NOTE - CLINICAL SUMMARY MEDICAL DECISION MAKING FREE TEXT BOX
22 y/o f presents for 7 day hcg following mtx for ectopic pregnancy.  Pt well appearing in ED, hcg downtrending, seen by gyn, will d/c to f/u with Dr. Hansen on 12/5

## 2023-12-01 DIAGNOSIS — O00.90 UNSPECIFIED ECTOPIC PREGNANCY WITHOUT INTRAUTERINE PREGNANCY: ICD-10-CM

## 2023-12-01 DIAGNOSIS — Z88.1 ALLERGY STATUS TO OTHER ANTIBIOTIC AGENTS STATUS: ICD-10-CM

## 2024-06-03 NOTE — ED ADULT NURSE NOTE - SUICIDE SCREENING QUESTION 2
Pending Prescriptions:                       Disp   Refills    bisacodyl (DULCOLAX) 10 MG suppository    30 sup*2            Sig: Place 1 suppository (10 mg) rectally daily      
No

## 2025-05-19 NOTE — ED ADULT NURSE NOTE - HIV OFFER
Tricia called today, she would like to taper off Cymbalta. Currently taking 1 30 mg capsule daily. Please advise   Previously Declined (within the last year)